# Patient Record
Sex: FEMALE | Race: OTHER | Employment: FULL TIME | URBAN - METROPOLITAN AREA
[De-identification: names, ages, dates, MRNs, and addresses within clinical notes are randomized per-mention and may not be internally consistent; named-entity substitution may affect disease eponyms.]

---

## 2017-02-10 ENCOUNTER — APPOINTMENT (OUTPATIENT)
Dept: GENERAL RADIOLOGY | Age: 57
End: 2017-02-10
Attending: NURSE PRACTITIONER
Payer: SELF-PAY

## 2017-02-10 ENCOUNTER — OFFICE VISIT (OUTPATIENT)
Dept: FAMILY MEDICINE CLINIC | Facility: CLINIC | Age: 57
End: 2017-02-10

## 2017-02-10 ENCOUNTER — HOSPITAL ENCOUNTER (EMERGENCY)
Age: 57
Discharge: HOME | End: 2017-02-10
Attending: EMERGENCY MEDICINE
Payer: SELF-PAY

## 2017-02-10 VITALS
HEART RATE: 66 BPM | HEIGHT: 60 IN | SYSTOLIC BLOOD PRESSURE: 220 MMHG | RESPIRATION RATE: 15 BRPM | DIASTOLIC BLOOD PRESSURE: 110 MMHG | TEMPERATURE: 95.5 F | BODY MASS INDEX: 22.42 KG/M2 | WEIGHT: 114.2 LBS | OXYGEN SATURATION: 99 %

## 2017-02-10 VITALS
RESPIRATION RATE: 15 BRPM | HEIGHT: 59 IN | TEMPERATURE: 97.4 F | BODY MASS INDEX: 22.98 KG/M2 | SYSTOLIC BLOOD PRESSURE: 155 MMHG | HEART RATE: 72 BPM | DIASTOLIC BLOOD PRESSURE: 70 MMHG | WEIGHT: 114 LBS | OXYGEN SATURATION: 100 %

## 2017-02-10 DIAGNOSIS — I16.0 HYPERTENSIVE URGENCY: Primary | ICD-10-CM

## 2017-02-10 DIAGNOSIS — Z86.73 HISTORY OF TIA (TRANSIENT ISCHEMIC ATTACK): ICD-10-CM

## 2017-02-10 DIAGNOSIS — Z12.11 SPECIAL SCREENING FOR MALIGNANT NEOPLASMS, COLON: ICD-10-CM

## 2017-02-10 DIAGNOSIS — I10 ESSENTIAL HYPERTENSION: ICD-10-CM

## 2017-02-10 DIAGNOSIS — I16.0 HYPERTENSIVE URGENCY: ICD-10-CM

## 2017-02-10 DIAGNOSIS — Z76.89 ENCOUNTER TO ESTABLISH CARE: Primary | ICD-10-CM

## 2017-02-10 DIAGNOSIS — I47.1 SVT (SUPRAVENTRICULAR TACHYCARDIA) (HCC): ICD-10-CM

## 2017-02-10 DIAGNOSIS — Z12.39 BREAST CANCER SCREENING: ICD-10-CM

## 2017-02-10 LAB
ALBUMIN SERPL BCP-MCNC: 4.6 G/DL (ref 3.4–5)
ALBUMIN/GLOB SERPL: 1.4 {RATIO} (ref 0.8–1.7)
ALP SERPL-CCNC: 111 U/L (ref 45–117)
ALT SERPL-CCNC: 20 U/L (ref 13–56)
ANION GAP BLD CALC-SCNC: 6 MMOL/L (ref 3–18)
APPEARANCE UR: CLEAR
AST SERPL W P-5'-P-CCNC: 22 U/L (ref 15–37)
BASOPHILS # BLD AUTO: 0 K/UL (ref 0–0.06)
BASOPHILS # BLD: 0 % (ref 0–2)
BILIRUB DIRECT SERPL-MCNC: 0.1 MG/DL (ref 0–0.2)
BILIRUB SERPL-MCNC: 0.4 MG/DL (ref 0.2–1)
BILIRUB UR QL: NEGATIVE
BUN SERPL-MCNC: 17 MG/DL (ref 7–18)
BUN/CREAT SERPL: 22 (ref 12–20)
CALCIUM SERPL-MCNC: 9.6 MG/DL (ref 8.5–10.1)
CHLORIDE SERPL-SCNC: 104 MMOL/L (ref 100–108)
CK MB CFR SERPL CALC: 0.6 % (ref 0–4)
CK MB SERPL-MCNC: 0.6 NG/ML (ref 0.5–3.6)
CK SERPL-CCNC: 100 U/L (ref 26–192)
CO2 SERPL-SCNC: 31 MMOL/L (ref 21–32)
COLOR UR: YELLOW
CREAT SERPL-MCNC: 0.76 MG/DL (ref 0.6–1.3)
DIFFERENTIAL METHOD BLD: NORMAL
EOSINOPHIL # BLD: 0 K/UL (ref 0–0.4)
EOSINOPHIL NFR BLD: 0 % (ref 0–5)
ERYTHROCYTE [DISTWIDTH] IN BLOOD BY AUTOMATED COUNT: 12.6 % (ref 11.6–14.5)
GLOBULIN SER CALC-MCNC: 3.3 G/DL (ref 2–4)
GLUCOSE SERPL-MCNC: 96 MG/DL (ref 74–99)
GLUCOSE UR STRIP.AUTO-MCNC: NEGATIVE MG/DL
HCT VFR BLD AUTO: 42.3 % (ref 35–45)
HGB BLD-MCNC: 14.2 G/DL (ref 12–16)
HGB UR QL STRIP: NEGATIVE
KETONES UR QL STRIP.AUTO: NEGATIVE MG/DL
LEUKOCYTE ESTERASE UR QL STRIP.AUTO: NEGATIVE
LYMPHOCYTES # BLD AUTO: 28 % (ref 21–52)
LYMPHOCYTES # BLD: 1.7 K/UL (ref 0.9–3.6)
MCH RBC QN AUTO: 30.7 PG (ref 24–34)
MCHC RBC AUTO-ENTMCNC: 33.6 G/DL (ref 31–37)
MCV RBC AUTO: 91.6 FL (ref 74–97)
MONOCYTES # BLD: 0.3 K/UL (ref 0.05–1.2)
MONOCYTES NFR BLD AUTO: 4 % (ref 3–10)
NEUTS SEG # BLD: 4.1 K/UL (ref 1.8–8)
NEUTS SEG NFR BLD AUTO: 68 % (ref 40–73)
NITRITE UR QL STRIP.AUTO: NEGATIVE
PH UR STRIP: 7.5 [PH] (ref 5–8)
PLATELET # BLD AUTO: 252 K/UL (ref 135–420)
PMV BLD AUTO: 10.2 FL (ref 9.2–11.8)
POTASSIUM SERPL-SCNC: 3.8 MMOL/L (ref 3.5–5.5)
PROT SERPL-MCNC: 7.9 G/DL (ref 6.4–8.2)
PROT UR STRIP-MCNC: NEGATIVE MG/DL
RBC # BLD AUTO: 4.62 M/UL (ref 4.2–5.3)
SODIUM SERPL-SCNC: 141 MMOL/L (ref 136–145)
SP GR UR REFRACTOMETRY: 1.01 (ref 1–1.03)
TROPONIN I SERPL-MCNC: <0.02 NG/ML (ref 0–0.04)
UROBILINOGEN UR QL STRIP.AUTO: 0.2 EU/DL (ref 0.2–1)
WBC # BLD AUTO: 6.1 K/UL (ref 4.6–13.2)

## 2017-02-10 PROCEDURE — 82550 ASSAY OF CK (CPK): CPT | Performed by: NURSE PRACTITIONER

## 2017-02-10 PROCEDURE — 81003 URINALYSIS AUTO W/O SCOPE: CPT | Performed by: EMERGENCY MEDICINE

## 2017-02-10 PROCEDURE — 93005 ELECTROCARDIOGRAM TRACING: CPT

## 2017-02-10 PROCEDURE — 96374 THER/PROPH/DIAG INJ IV PUSH: CPT

## 2017-02-10 PROCEDURE — 74011250636 HC RX REV CODE- 250/636: Performed by: NURSE PRACTITIONER

## 2017-02-10 PROCEDURE — 71010 XR CHEST PORT: CPT

## 2017-02-10 PROCEDURE — 80048 BASIC METABOLIC PNL TOTAL CA: CPT | Performed by: EMERGENCY MEDICINE

## 2017-02-10 PROCEDURE — 80076 HEPATIC FUNCTION PANEL: CPT | Performed by: EMERGENCY MEDICINE

## 2017-02-10 PROCEDURE — 99285 EMERGENCY DEPT VISIT HI MDM: CPT

## 2017-02-10 PROCEDURE — 85025 COMPLETE CBC W/AUTO DIFF WBC: CPT | Performed by: EMERGENCY MEDICINE

## 2017-02-10 RX ORDER — GUAIFENESIN 100 MG/5ML
81 LIQUID (ML) ORAL DAILY
COMMUNITY

## 2017-02-10 RX ORDER — MULTIVIT WITH MINERALS/HERBS
1 TABLET ORAL DAILY
COMMUNITY

## 2017-02-10 RX ORDER — METOPROLOL SUCCINATE 25 MG/1
12.5 TABLET, EXTENDED RELEASE ORAL 2 TIMES DAILY
Qty: 30 TAB | Refills: 2 | Status: SHIPPED | OUTPATIENT
Start: 2017-02-10 | End: 2017-04-25 | Stop reason: SDUPTHER

## 2017-02-10 RX ORDER — HYDRALAZINE HYDROCHLORIDE 20 MG/ML
10 INJECTION INTRAMUSCULAR; INTRAVENOUS ONCE
Status: COMPLETED | OUTPATIENT
Start: 2017-02-10 | End: 2017-02-10

## 2017-02-10 RX ORDER — MENTHOL
1000 GEL (GRAM) TOPICAL DAILY
COMMUNITY

## 2017-02-10 RX ORDER — METOPROLOL SUCCINATE 25 MG/1
TABLET, EXTENDED RELEASE ORAL DAILY
COMMUNITY
End: 2017-02-10 | Stop reason: SDUPTHER

## 2017-02-10 RX ADMIN — HYDRALAZINE HYDROCHLORIDE 10 MG: 20 INJECTION INTRAMUSCULAR; INTRAVENOUS at 13:07

## 2017-02-10 NOTE — ED NOTES
Pt being discharged home. Discharge instructions given, and pt expresses understanding. Discontinued IV and helped patient to gather belongings. Pt discharged with family member.

## 2017-02-10 NOTE — PROGRESS NOTES
Jerzy Ventura is a 60 y.o.  female presents today for office visit for establish care. Pt is in Room # 8.      1. Have you been to the ER, urgent care clinic since your last visit? Hospitalized since your last visit? Yes [facility] 7/24/14 for TIA, [facility] for [facility]. 2. Have you seen or consulted any other health care providers outside of the [address] [place] [address] since your last visit? Include any pap smears or colon screening.  Yes Dr. Nick Chiu, PCP , [place], [place] [address] and Cardiology( [facility] 8/2016      Upcoming Appts  N/A

## 2017-02-10 NOTE — ED PROVIDER NOTES
HPI Comments: Brookie Baumgarten is a 60year old dfemale who presetns to the ED after being sent here by her family physician. Pt blood pressure was up in the office, and she was snet here for further evaluation of this phenomenon. Pt denies all pain. States she has no Hx of MI. Patient is a 60 y.o. female presenting with hypertension. The history is provided by the patient. History limited by: no communication barrier. Hypertension    This is a new problem. The current episode started 3 to 5 hours ago. The problem has not changed since onset. Past Medical History:   Diagnosis Date    FHx: SVT (supraventricular tachycardia) 05/2016    Fibroid uterus 2011    H/O cyst of breast 1987    Hypertension     TIA (transient ischemic attack) 2014       Past Surgical History:   Procedure Laterality Date    Hx breast biopsy Right 1987         Family History:   Problem Relation Age of Onset    Hypertension Other     Cancer Mother 36     Cervical    Stroke Father     No Known Problems Sister     Heart Attack Paternal Aunt     No Known Problems Sister        Social History     Social History    Marital status:      Spouse name: N/A    Number of children: 0    Years of education: 12     Occupational History    Not on file. Social History Main Topics    Smoking status: Never Smoker    Smokeless tobacco: Never Used    Alcohol use No    Drug use: No    Sexual activity: No     Other Topics Concern     Service No    Caffeine Concern No    Occupational Exposure No    Hobby Hazards No    Stress Concern Yes    Special Diet No    Seat Belt Yes     Social History Narrative         ALLERGIES: Aldomet [methyldopa]; Benadryl [diphenhydramine hcl]; Cozaar [losartan]; Dyazide [triamterene-hydrochlorothiazid]; Levaquin [levofloxacin]; Norvasc [amlodipine]; Penicillins; Sulfa (sulfonamide antibiotics); and Verapamil    Review of Systems   Constitutional: Negative. HENT: Negative. Eyes: Negative. Respiratory: Negative. Cardiovascular:        Elevated blood pressure     Gastrointestinal: Negative. Endocrine: Negative. Genitourinary: Negative. Musculoskeletal: Negative. Skin: Negative. Allergic/Immunologic: Negative. Neurological: Negative. Hematological: Negative. Psychiatric/Behavioral: Negative. Vitals:    02/10/17 1153   BP: (!) 236/117   Pulse: 63   Resp: 17   Temp: 97.4 °F (36.3 °C)   SpO2: 100%   Weight: 51.7 kg (114 lb)   Height: 4' 11\" (1.499 m)            Physical Exam   Constitutional: She is oriented to person, place, and time. She appears well-developed and well-nourished. No distress. HENT:   Head: Normocephalic and atraumatic. Eyes: EOM are normal. Pupils are equal, round, and reactive to light. Neck: Normal range of motion. Neck supple. Cardiovascular: Normal rate, regular rhythm, normal heart sounds and intact distal pulses. Pulmonary/Chest: Effort normal. No respiratory distress. She has no wheezes. She has no rales. Abdominal: Soft. Bowel sounds are normal. There is no tenderness. There is no rebound. Genitourinary:   Genitourinary Comments: NE   Musculoskeletal: Normal range of motion. Neurological: She is alert and oriented to person, place, and time. No cranial nerve deficit. Coordination normal.   Skin: Skin is warm and dry. Psychiatric: She has a normal mood and affect. Nursing note and vitals reviewed. MDM  Number of Diagnoses or Management Options  Hypertensive urgency:      Amount and/or Complexity of Data Reviewed  Tests in the radiology section of CPT®: ordered and reviewed    Risk of Complications, Morbidity, and/or Mortality  Presenting problems: moderate  Diagnostic procedures: moderate  Management options: moderate    Patient Progress  Patient progress: stable    ED Course       Procedures    Diagnosis:   1. Hypertensive urgency          Disposition:   Discharged to Home.       Follow-up Information     Follow up With Details Comments [address] [place], [place] Call today for a follow up appointment. [address] [place] [address] [phone]            Patient's Medications   Start Taking    No medications on file   Continue Taking    ASPIRIN 81 MG CHEWABLE TABLET    Take 81 mg by mouth daily. B COMPLEX VITAMINS TABLET    Take 1 Tab by mouth daily. METOPROLOL SUCCINATE (TOPROL-XL) 25 MG XL TABLET    Take 0.5 Tabs by mouth two (2) times a day. MULTIVIT WITH CALCIUM,IRON,MIN [facility] MULTIPLE VITAMINS PO)    Take 1 Tab by mouth daily. VITAMIN E (E GEMS) 1,000 UNIT CAPSULE    Take 1,000 Units by mouth daily.    These Medications have changed    No medications on file   Stop Taking    No medications on file

## 2017-02-10 NOTE — PATIENT INSTRUCTIONS
Learning About Diuretics for High Blood Pressure  Introduction  Diuretics help to lower blood pressure. This reduces your risk of a heart attack and stroke. It also reduces your risk of kidney disease. Diuretics cause your kidneys to remove sodium and water. They also relax the blood vessel walls. These help lower your blood pressure. Examples  · Chlorthalidone  · Hydrochlorothiazide  Possible side effects  There are some common side effects. They are:  · Too little potassium. · Feeling dizzy. · Rash. · Urinating a lot. · High blood sugar. (But this is not common.)  You may have other side effects. Check the information that comes with your medicine. What to know about taking this medicine  · You may take other medicines for blood pressure. Diuretics can help those work better. They can also prevent extra fluid in your body. · You may need to take potassium pills. Or you may have to watch how much potassium is in your food. Ask your doctor about this. · You may need blood tests to check your kidneys and your potassium level. · Take your medicines exactly as prescribed. Call your doctor if you think you are having a problem with your medicine. · Check with your doctor or pharmacist before you use any other medicines. This includes over-the-counter medicines. Make sure your doctor knows all of the medicines, vitamins, herbal products, and supplements you take. Taking some medicines together can cause problems. Where can you learn more? Go to http://jono-osito.info/. Enter E175 in the search box to learn more about \"Learning About Diuretics for High Blood Pressure. \"  Current as of: January 27, 2016  Content Version: 11.1  © 8621-3146 [employer]. Care instructions adapted under license by [employer] (which disclaims liability or warranty for this information).  If you have questions about a medical condition or this instruction, always ask your healthcare professional. [address] any warranty or liability for your use of this information.

## 2017-02-10 NOTE — DISCHARGE INSTRUCTIONS
Take your BP medication as prescribed. Follow up with your primary care provider for re-evaluation of your hypertensive plan. MyChart Activation    Thank you for requesting access to [facility]. Please follow the instructions below to securely access and download your online medical record. [facility] allows you to send messages to your doctor, view your test results, renew your prescriptions, schedule appointments, and more. How Do I Sign Up? 1. In your internet browser, go to [email or web address]  2. Click on the First Time User? Click Here link in the Sign In box. You will be redirect to the New Member Sign Up page. 3. Enter your [facility] Access Code exactly as it appears below. You will not need to use this code after youve completed the sign-up process. If you do not sign up before the expiration date, you must request a new code. [facility] Access Code: [email or web address]  Expires: 2017  9:42 AM (This is the date your [facility] access code will )    4. Enter the last four digits of your Social Security Number (xxxx) and Date of Birth (mm/dd/yyyy) as indicated and click Submit. You will be taken to the next sign-up page. 5. Create a [facility] ID. This will be your [facility] login ID and cannot be changed, so think of one that is secure and easy to remember. 6. Create a [facility] password. You can change your password at any time. 7. Enter your Password Reset Question and Answer. This can be used at a later time if you forget your password. 8. Enter your e-mail address. You will receive e-mail notification when new information is available in [address]. 9. Click Sign Up. You can now view and download portions of your medical record. 10. Click the Download Summary menu link to download a portable copy of your medical information.     Additional Information    If you have questions, please visit the Frequently Asked Questions section of the [facility] website at [email or web address] Remember, [employer] is NOT to be used for urgent needs. For medical emergencies, dial 911.

## 2017-02-10 NOTE — PROGRESS NOTES
History and Physical    Patient: Tone Craig MRN: [id number]  SSN: [id number]    YOB: 1960  Age: 60 y.o. Sex: female      Subjective:      Tone Craig is a 60 y.o. female who presents today establish care. Patient currently does not have health insurance. Patient has a h/o SVT, patient was seeing a cardiologist in [place], Dr. Ame Soto, she states that because she only had one run of SVT within the past year that she did not need to be on anything else except for metoprolol. Prior to moving to [place], she was seeing a cardiologist at the [facility] clinic, which is a free cardiac clinic here. Patient has a h/o HTN, she monitors her BP at home, she states that before she came over here her BP was 130/70, she admits to having a h/o white coat HTN, BP extremely high today. Patient states that she can no longer take calcium channel blockers as after a while her body started to reject it, she started to get a rash and tingling sensation. Patient is currently on metoprolol 25 mg but cuts it in half, she states that she has an intolerance to higher doses, makes her really tired. Patient has an allergy to ACE/ARBS, causes rash and \"messes with her nervous system. \"   Patient states that she was on Diazide but it made her feel like she had electricity down her spinal column. She states that these reactions are delayed, so she may be on these medications for about a month and then will notice symptoms. She took half of her metoprolol today, currently c/o dull headache, but states she wakes up with them. Patient has a h/o TIA 3 years ago, she states that her blood pressure was extremely high at the time. She denies recurrent episodes such as confusion, slurred speech, unilateral weakness etc.  Patient has a family h/o stroke and heart attack, she is currently on 80 ASA.         Last Colonoscopy:  has not had done yet-fit ordered  Last DEXA: never done; will await patient to have health insurance first  Last Mammogram: 2011- was normal, no h/o abnormals-ordered today  Last PAP: 3/2016-normal;  no h/o abnormals      PMH:  Past Medical History   Diagnosis Date    FHx: SVT (supraventricular tachycardia) 05/2016    Fibroid uterus 2011    H/O cyst of breast 1987    Hypertension     TIA (transient ischemic attack) 2014     Past Surgical History   Procedure Laterality Date    Hx breast biopsy Right 1987        FamHx:  Family History   Problem Relation Age of Onset    Hypertension Other     Cancer Mother 36     Cervical    Stroke Father     No Known Problems Sister     Heart Attack Paternal Aunt     No Known Problems Sister        SocialHx:  Social History   Substance Use Topics    Smoking status: Never Smoker    Smokeless tobacco: Never Used    Alcohol use No        Meds:  Prior to Admission medications    Medication Sig Start Date End Date Taking? Authorizing Provider   vitamin e (E GEMS) 1,000 unit capsule Take 1,000 Units by mouth daily. Yes Historical Provider   b complex vitamins tablet Take 1 Tab by mouth daily. Yes Historical Provider   MULTIVIT WITH CALCIUM,IRON,MIN [facility] MULTIPLE VITAMINS PO) Take 1 Tab by mouth daily. Yes Historical Provider   metoprolol succinate (TOPROL-XL) 25 mg XL tablet Take 0.5 Tabs by mouth two (2) times a day. 2/10/17  Yes Corunna Foot V, PA   aspirin 81 mg chewable tablet Take 81 mg by mouth daily. Yes Historical Provider        Allergies: Allergies   Allergen Reactions    Aldomet [Methyldopa] Rash    Benadryl [Diphenhydramine Hcl] Other (comments)     incontinence    Cozaar [Losartan] Other (comments)     Neurological issues    Dyazide [Triamterene-Hydrochlorothiazid] Other (comments)     Neurological issues    Levaquin [Levofloxacin] Hives    Norvasc [Amlodipine] Rash and Other (comments)     Neurological issues    Penicillins Rash     Ok to take 2nd generation.     Sulfa (Sulfonamide Antibiotics) Rash    Verapamil Other (comments)     Neurological issues       Review of Systems:  Items in Bold are positive  Constitutional: negative for fevers, chills and malaise  Eyes: negative for visual disturbance  Ears, Nose, Mouth, Throat, and Face: negative for nasal congestion  Respiratory: negative for cough or SOB  Cardiovascular: negative for chest pain, chest pressure/discomfort  Gastrointestinal: negative for nausea, vomiting, melena, diarrhea, constipation and abdominal pain  Genitourinary:negative for frequency, dysuria or hematuria  Musculoskeletal:negative for myalgias and arthralgias  Neurological: dull headache, negative for dizziness and paresthesia  Psych: anxiety-stable, denies depression, si/hi    Objective:     Visit Vitals    BP (!) 220/110 (BP 1 Location: Left arm, BP Patient Position: Sitting)    Pulse 66    Temp 95.5 °F (35.3 °C) (Oral)    Resp 15    Ht 4' 11.5\" (1.511 m)    Wt 114 lb 3.2 oz (51.8 kg)    SpO2 99%    BMI 22.68 kg/m2       Physical Exam:  GENERAL: alert, cooperative, no distress, appears stated age  HEENT: EYE: conjunctivae/corneas clear. PERRL, EOM's intact. EAR: TM's pearly gray bilaterally NOSE: Nasal mucosa pink and moist bilaterally, THROAT: no erythema or edema  THYROID: no thyromegaly  NECK: no adenopathy  LUNG: clear to auscultation bilaterally  HEART: regular rate and rhythm, S1, S2 normal, no murmur, click, rub or gallop  ABDOMEN: soft, non-tender. Bowel sounds normal. No masses  EXTREMITIES:  extremities normal, atraumatic, no cyanosis or edema  NEUROLOGIC: AOx3. Gait normal.  PSYCH: mood: happy; affect: slightly anxious      Assessment and Plan:       ICD-10-CM ICD-9-CM    1. Encounter to establish care Z76.89 V65.8 CBC WITH AUTOMATED DIFF      METABOLIC PANEL, COMPREHENSIVE      HEMOGLOBIN A1C WITH EAG      LIPID PANEL      VITAMIN D, 25 HYDROXY      TSH 3RD GENERATION      T4, FREE   2. Essential hypertension I10 401.9 metoprolol succinate (TOPROL-XL) 25 mg XL tablet   3. Hypertensive urgency I16.0 401.9    4. SVT (supraventricular tachycardia) I47.1 427.89 metoprolol succinate (TOPROL-XL) 25 mg XL tablet      REFERRAL TO CARDIOLOGY   5. History of TIA (transient ischemic attack) Z86.73 V12.54    6. Special screening for malignant neoplasms, colon Z12.11 V76.51 OCCULT BLOOD, IMMUNOASSAY (FIT)   7. Breast cancer screening Z12.39 V76.10 VANDANA MAMMO BI SCREENING INCL CAD         Medical Decision Making:  Establish Care- will await medical records with labs    HTN/Hypertensive Urgency- EMS called, patient transported    SVT- referral to cardiology    History of TIA- continue asa- need medical records          Follow-up Disposition:  Return in about 1 week (around 2/17/2017) for routine care with me, for bp check. Patient acknowledges understanding of instructions and acknowledges understanding to call back if current symptoms worsen or new symptoms arise. Patient acknowledges and agrees with plan.         Signed By: ARUNA Osborne     February 10, 2017

## 2017-02-12 LAB
ATRIAL RATE: 66 BPM
CALCULATED P AXIS, ECG09: 29 DEGREES
CALCULATED R AXIS, ECG10: -16 DEGREES
CALCULATED T AXIS, ECG11: 29 DEGREES
DIAGNOSIS, 93000: NORMAL
P-R INTERVAL, ECG05: 114 MS
Q-T INTERVAL, ECG07: 444 MS
QRS DURATION, ECG06: 82 MS
QTC CALCULATION (BEZET), ECG08: 465 MS
VENTRICULAR RATE, ECG03: 66 BPM

## 2017-02-17 ENCOUNTER — OFFICE VISIT (OUTPATIENT)
Dept: FAMILY MEDICINE CLINIC | Facility: CLINIC | Age: 57
End: 2017-02-17

## 2017-02-17 VITALS
TEMPERATURE: 95.6 F | OXYGEN SATURATION: 99 % | WEIGHT: 114 LBS | DIASTOLIC BLOOD PRESSURE: 94 MMHG | HEIGHT: 59 IN | BODY MASS INDEX: 22.98 KG/M2 | SYSTOLIC BLOOD PRESSURE: 180 MMHG | RESPIRATION RATE: 16 BRPM | HEART RATE: 96 BPM

## 2017-02-17 DIAGNOSIS — Z86.73 HISTORY OF TIA (TRANSIENT ISCHEMIC ATTACK): ICD-10-CM

## 2017-02-17 DIAGNOSIS — I10 ESSENTIAL HYPERTENSION: Primary | ICD-10-CM

## 2017-02-17 DIAGNOSIS — I47.1 SVT (SUPRAVENTRICULAR TACHYCARDIA) (HCC): ICD-10-CM

## 2017-02-17 DIAGNOSIS — F41.9 ANXIETY: ICD-10-CM

## 2017-02-17 RX ORDER — HYDROCHLOROTHIAZIDE 12.5 MG/1
12.5 TABLET ORAL DAILY
Qty: 30 TAB | Refills: 0 | Status: SHIPPED | OUTPATIENT
Start: 2017-02-17 | End: 2017-03-28 | Stop reason: SDUPTHER

## 2017-02-17 RX ORDER — ALPRAZOLAM 0.25 MG/1
0.25 TABLET ORAL
Qty: 30 TAB | Refills: 0 | Status: SHIPPED | OUTPATIENT
Start: 2017-02-17 | End: 2017-07-05

## 2017-02-17 RX ORDER — FLUOXETINE 10 MG/1
10 CAPSULE ORAL DAILY
Qty: 30 CAP | Refills: 1 | Status: SHIPPED | OUTPATIENT
Start: 2017-02-17 | End: 2017-07-05

## 2017-02-17 NOTE — PROGRESS NOTES
History and Physical    Patient: Екатерина Louis MRN: [id number]  SSN: [id number]    YOB: 1960  Age: 60 y.o. Sex: female      Subjective:      Екатерина Louis is a 60 y.o. female who presents today for an ED follow up from 2/10/17 for hypertensive urgency. Patients BP remains uncontrolled, only taking half tablet of Metorpolol 25 mg BID, has many drug allergies/insensitivities. Still having headaches with new onset sinus pressure for about 1 week, has been taking claritin, headache not better. Her BP drug intolerance is as follows:    Lopressor, extreme tinging, numb sensation and heaviness in arms. Verapamil, severe tachycardia, felt like she could not move. Calcium channel blockers, irregular heart beat: both decreased heart rate and tachycardia    Patient also admits to feeling very stressed lately, which she believes is contributing to her anxiety. She has not been treated for anxiety in the past, she denies depression, si/hi.     Last Colonoscopy:  has not had done yet-fit ordered-not done  Last DEXA: never done; will await patient to have health insurance first  Last Mammogram: 2011- was normal, no h/o abnormals-scheduled for 3/28/17  Last PAP: 3/2016-normal; no h/o abnormals      PMH:  Past Medical History   Diagnosis Date    FHx: SVT (supraventricular tachycardia) 05/2016    Fibroid uterus 2011    H/O cyst of breast 1987    Hypertension     TIA (transient ischemic attack) 2014     Past Surgical History   Procedure Laterality Date    Hx breast biopsy Right 1987        FamHx:  Family History   Problem Relation Age of Onset    Hypertension Other     Cancer Mother 36     Cervical    Stroke Father     No Known Problems Sister     Heart Attack Paternal Aunt     No Known Problems Sister        SocialHx:  Social History   Substance Use Topics    Smoking status: Never Smoker    Smokeless tobacco: Never Used    Alcohol use No        Meds:  Prior to Admission medications    Medication Sig Start Date End Date Taking? Authorizing Provider   ALPRAZolam Geoffry Chill) 0.25 mg tablet Take 1 Tab by mouth daily as needed for Anxiety. 2/17/17  Yes Jessica Pumphrey V, PA   hydroCHLOROthiazide (HYDRODIURIL) 12.5 mg tablet Take 1 Tab by mouth daily. 2/17/17  Yes Jessica Pumphrey V, PA   FLUoxetine (PROZAC) 10 mg capsule Take 1 Cap by mouth daily. 2/17/17  Yes Jessica Pumphrey V, PA   vitamin e (E GEMS) 1,000 unit capsule Take 1,000 Units by mouth daily. Yes Historical Provider   b complex vitamins tablet Take 1 Tab by mouth daily. Yes Historical Provider   MULTIVIT WITH CALCIUM,IRON,MIN [facility] MULTIPLE VITAMINS PO) Take 1 Tab by mouth daily. Yes Historical Provider   metoprolol succinate (TOPROL-XL) 25 mg XL tablet Take 0.5 Tabs by mouth two (2) times a day. 2/10/17  Yes ARUNA Henriquez   aspirin 81 mg chewable tablet Take 81 mg by mouth daily. Yes Historical Provider        Allergies: Allergies   Allergen Reactions    Aldomet [Methyldopa] Rash    Benadryl [Diphenhydramine Hcl] Other (comments)     incontinence    Cozaar [Losartan] Other (comments)     Neurological issues    Dyazide [Triamterene-Hydrochlorothiazid] Other (comments)     Neurological issues    Levaquin [Levofloxacin] Hives    Norvasc [Amlodipine] Rash and Other (comments)     Neurological issues    Penicillins Rash     Ok to take 2nd generation.     Sulfa (Sulfonamide Antibiotics) Rash    Verapamil Other (comments)     Neurological issues       Review of Systems:  Items in Bold are positive  Constitutional: negative for fevers, chills and malaise  Eyes: negative for visual disturbance  Ears, Nose, Mouth, Throat, and Face: sinus pressure, negative for nasal congestion  Respiratory: negative for cough or SOB  Cardiovascular: negative for chest pain, chest pressure/discomfort  Gastrointestinal: negative for nausea, vomiting, melena, diarrhea, constipation and abdominal pain  Genitourinary:negative for frequency, dysuria or hematuria  Musculoskeletal:negative for myalgias and arthralgias  Neurological: headache, negative for dizziness and paresthesia  Psych: anxiety- uncontrolled, denies depression, si/hi    Objective:     Visit Vitals    BP (!) 180/94 (BP 1 Location: Left arm, BP Patient Position: Sitting)    Pulse 96    Temp 95.6 °F (35.3 °C) (Oral)    Resp 16    Ht 4' 11\" (1.499 m)    Wt 114 lb (51.7 kg)    SpO2 99%    BMI 23.03 kg/m2       Physical Exam:  GENERAL: alert, cooperative, no distress, appears stated age  HEENT: EYE: conjunctivae/corneas clear. PERRL, EOM's intact. LUNG: clear to auscultation bilaterally  HEART: regular rate and rhythm, S1, S2 normal, no murmur, click, rub or gallop  ABDOMEN: soft, non-tender. Bowel sounds normal. No masses  EXTREMITIES:  extremities normal, atraumatic, no cyanosis or edema  NEUROLOGIC: AOx3. Gait normal.  PSYCH: mood: anxious; affect: neutral/anxious    Labs  Lab Results   Component Value Date/Time    Sodium 141 02/10/2017 12:39 PM    Potassium 3.8 02/10/2017 12:39 PM    Chloride 104 02/10/2017 12:39 PM    CO2 31 02/10/2017 12:39 PM    Anion gap 6 02/10/2017 12:39 PM    Glucose 96 02/10/2017 12:39 PM    BUN 17 02/10/2017 12:39 PM    Creatinine 0.76 02/10/2017 12:39 PM    BUN/Creatinine ratio 22 02/10/2017 12:39 PM    GFR est AA >60 02/10/2017 12:39 PM    GFR est non-AA >60 02/10/2017 12:39 PM    Calcium 9.6 02/10/2017 12:39 PM    Bilirubin, total 0.4 02/10/2017 12:39 PM    AST (SGOT) 22 02/10/2017 12:39 PM    Alk.  phosphatase 111 02/10/2017 12:39 PM    Protein, total 7.9 02/10/2017 12:39 PM    Albumin 4.6 02/10/2017 12:39 PM    Globulin 3.3 02/10/2017 12:39 PM    A-G Ratio 1.4 02/10/2017 12:39 PM    ALT (SGPT) 20 02/10/2017 12:39 PM     Lab Results   Component Value Date/Time    WBC 6.1 02/10/2017 12:39 PM    Hemoglobin (POC) 12.6 09/22/2012 10:52 AM    HGB 14.2 02/10/2017 12:39 PM    Hematocrit (POC) 37 09/22/2012 10:52 AM    HCT 42.3 02/10/2017 12:39 PM PLATELET 820 71/57/5660 12:39 PM    MCV 91.6 02/10/2017 12:39 PM         Assessment and Plan:       ICD-10-CM ICD-9-CM    1. Essential hypertension I10 401.9 hydroCHLOROthiazide (HYDRODIURIL) 12.5 mg tablet   2. Anxiety F41.9 300.00 ALPRAZolam (XANAX) 0.25 mg tablet      FLUoxetine (PROZAC) 10 mg capsule   3. SVT (supraventricular tachycardia) I47.1 427.89    4. History of TIA (transient ischemic attack) Z86.73 V12.54          Medical Decision Making:  HTN- start HCTZ 12.5 mg + continue Metoprolol    Anxiety- start Prozac + Xanax PRN    SVT- referral to cardiology pending + continue BB    H/O TIA- continue asa      Follow-up Disposition:  Return in about 2 weeks (around 3/3/2017) for routine care with me. Patient acknowledges understanding of instructions and acknowledges understanding to call back if current symptoms worsen or new symptoms arise. Patient acknowledges and agrees with plan.         Signed By: ARUNA Osborne     February 19, 2017

## 2017-02-17 NOTE — PATIENT INSTRUCTIONS

## 2017-02-17 NOTE — MR AVS SNAPSHOT
Visit Information Date & Time Provider Department Dept. Phone Encounter #  
 2/17/2017 11:00 AM Noe Beauchamp [phone] [id number] Follow-up Instructions Return in about 2 weeks (around 3/3/2017) for routine care with me. Upcoming Health Maintenance Date Due  
 BREAST CANCER SCRN MAMMOGRAM 3/8/2008 FOBT Q 1 YEAR AGE 50-75 3/8/2008 PAP AKA CERVICAL CYTOLOGY 3/1/2019 DTaP/Tdap/Td series (2 - Td) 1/1/2026 Allergies as of 2/17/2017  Review Complete On: 2/17/2017 By: Jorge Bone LPN Severity Noted Reaction Type Reactions Aldomet [Methyldopa]  02/10/2017    Rash Benadryl [Diphenhydramine Hcl]  02/10/2017    Other (comments)  
 incontinence Cozaar [Losartan]  02/10/2017   Intolerance Other (comments) Neurological issues Dyazide [Triamterene-hydrochlorothiazid]  02/10/2017   Intolerance Other (comments) Neurological issues Levaquin [Levofloxacin]  02/10/2017   Intolerance Hives Norvasc [Amlodipine]  02/10/2017   Intolerance Rash, Other (comments) Neurological issues Penicillins  02/10/2017    Rash Ok to take 2nd generation. Sulfa (Sulfonamide Antibiotics)  02/10/2017   Side Effect Rash Verapamil  02/10/2017   Intolerance Other (comments) Neurological issues Current Immunizations  Never Reviewed No immunizations on file. Not reviewed this visit You Were Diagnosed With   
  
 Codes Comments Essential hypertension    -  Primary ICD-10-CM: I10 
ICD-9-CM: 401.9 Anxiety     ICD-10-CM: F41.9 ICD-9-CM: 300.00 Vitals BP Pulse Temp Resp Height(growth percentile) Weight(growth percentile) (!) 180/94 (BP 1 Location: Left arm, BP Patient Position: Sitting) 96 95.6 °F (35.3 °C) (Oral) 16 4' 11\" (1.499 m) 114 lb (51.7 kg) SpO2 BMI OB Status Smoking Status 99% 23.03 kg/m2 Menopause Never Smoker Vitals History BMI and BSA Data Body Mass Index Body Surface Area 23.03 kg/m 2 1.47 m 2 Preferred Pharmacy Pharmacy Name Phone [facility][id number]- [phone], [address] [facility] [phone] Your Updated Medication List  
  
   
This list is accurate as of: 2/17/17 11:30 AM.  Always use your most recent med list.  
  
  
  
  
 ALPRAZolam 0.25 mg tablet Commonly known as:  Stephanie Shahram Take 1 Tab by mouth daily as needed for Anxiety. aspirin 81 mg chewable tablet Take 81 mg by mouth daily. b complex vitamins tablet Take 1 Tab by mouth daily. FLUoxetine 10 mg capsule Commonly known as:  PROzac Take 1 Cap by mouth daily. hydroCHLOROthiazide 12.5 mg tablet Commonly known as:  HYDRODIURIL Take 1 Tab by mouth daily. metoprolol succinate 25 mg XL tablet Commonly known as:  TOPROL-XL Take 0.5 Tabs by mouth two (2) times a day. vitamin e 1,000 unit capsule Commonly known as:  E GEMS Take 1,000 Units by mouth daily. WOMEN'S MULTIPLE VITAMINS PO Take 1 Tab by mouth daily. Prescriptions Printed Refills ALPRAZolam (XANAX) 0.25 mg tablet 0 Sig: Take 1 Tab by mouth daily as needed for Anxiety. Class: Print Route: Oral  
  
Prescriptions Sent to Pharmacy Refills  
 hydroCHLOROthiazide (HYDRODIURIL) 12.5 mg tablet 0 Sig: Take 1 Tab by mouth daily. Class: Normal  
 Pharmacy:  [facility], [address] [facility] Ph #: [phone] Route: Oral  
 FLUoxetine (PROZAC) 10 mg capsule 1 Sig: Take 1 Cap by mouth daily. Class: Normal  
 [facility] [address] [place] [address] Ph #: [phone] Route: Oral  
  
Follow-up Instructions Return in about 2 weeks (around 3/3/2017) for routine care with me. To-Do List   
 03/28/2017 11:00 AM  
  Appointment with [facility] at [facility] ([phone]) OUTSIDE FILMS  - Any outside films related to the study being scheduled should be brought with you on the day of the exam.  If this cannot be done there may be a delay in the reading of the study. MEDICATIONS  - Patient must bring a complete list of all medications currently taking to include prescriptions, over-the-counter meds, herbals, vitamins & any dietary supplements  GENERAL INSTRUCTIONS  - On the day of your exam do not use any bath powder, deodorant or lotions on the armpit area. -Tenderness of breasts may cause an increase of discomfort during procedure. If you are experiencing breast tenderness on the day of your appointment and would like to reschedule, please call [phone]. Patient Instructions DASH Diet: Care Instructions Your Care Instructions The DASH diet is an eating plan that can help lower your blood pressure. DASH stands for Dietary Approaches to Stop Hypertension. Hypertension is high blood pressure. The DASH diet focuses on eating foods that are high in calcium, potassium, and magnesium. These nutrients can lower blood pressure. The foods that are highest in these nutrients are fruits, vegetables, low-fat dairy products, nuts, seeds, and legumes. But taking calcium, potassium, and magnesium supplements instead of eating foods that are high in those nutrients does not have the same effect. The DASH diet also includes whole grains, fish, and poultry. The DASH diet is one of several lifestyle changes your doctor may recommend to lower your high blood pressure. Your doctor may also want you to decrease the amount of sodium in your diet. Lowering sodium while following the DASH diet can lower blood pressure even further than just the DASH diet alone. Follow-up care is a key part of your treatment and safety. Be sure to make and go to all appointments, and call your doctor if you are having problems.  It's also a good idea to know your test results and keep a list of the medicines you take. How can you care for yourself at home? Following the DASH diet · Eat 4 to 5 servings of fruit each day. A serving is 1 medium-sized piece of fruit, ½ cup chopped or canned fruit, 1/4 cup dried fruit, or 4 ounces (½ cup) of fruit juice. Choose fruit more often than fruit juice. · Eat 4 to 5 servings of vegetables each day. A serving is 1 cup of lettuce or raw leafy vegetables, ½ cup of chopped or cooked vegetables, or 4 ounces (½ cup) of vegetable juice. Choose vegetables more often than vegetable juice. · Get 2 to 3 servings of low-fat and fat-free dairy each day. A serving is 8 ounces of milk, 1 cup of yogurt, or 1 ½ ounces of cheese. · Eat 6 to 8 servings of grains each day. A serving is 1 slice of bread, 1 ounce of dry cereal, or ½ cup of cooked rice, pasta, or cooked cereal. Try to choose whole-grain products as much as possible. · Limit lean meat, poultry, and fish to 2 servings each day. A serving is 3 ounces, about the size of a deck of cards. · Eat 4 to 5 servings of nuts, seeds, and legumes (cooked dried beans, lentils, and split peas) each week. A serving is 1/3 cup of nuts, 2 tablespoons of seeds, or ½ cup of cooked beans or peas. · Limit fats and oils to 2 to 3 servings each day. A serving is 1 teaspoon of vegetable oil or 2 tablespoons of salad dressing. · Limit sweets and added sugars to 5 servings or less a week. A serving is 1 tablespoon jelly or jam, ½ cup sorbet, or 1 cup of lemonade. · Eat less than 2,300 milligrams (mg) of sodium a day. If you limit your sodium to 1,500 mg a day, you can lower your blood pressure even more. Tips for success · Start small. Do not try to make dramatic changes to your diet all at once. You might feel that you are missing out on your favorite foods and then be more likely to not follow the plan. Make small changes, and stick with them. Once those changes become habit, add a few more changes. · Try some of the following: ¨ Make it a goal to eat a fruit or vegetable at every meal and at snacks. This will make it easy to get the recommended amount of fruits and vegetables each day. ¨ Try yogurt topped with fruit and nuts for a snack or healthy dessert. ¨ Add lettuce, tomato, cucumber, and onion to sandwiches. ¨ Combine a ready-made pizza crust with low-fat mozzarella cheese and lots of vegetable toppings. Try using tomatoes, squash, spinach, broccoli, carrots, cauliflower, and onions. ¨ Have a variety of cut-up vegetables with a low-fat dip as an appetizer instead of chips and dip. ¨ Sprinkle sunflower seeds or chopped almonds over salads. Or try adding chopped walnuts or almonds to cooked vegetables. ¨ Try some vegetarian meals using beans and peas. Add garbanzo or kidney beans to salads. Make burritos and tacos with mashed mendoza beans or black beans. Where can you learn more? Go to http://jono-osito.info/. Enter B513 in the search box to learn more about \"DASH Diet: Care Instructions. \" Current as of: March 23, 2016 Content Version: 11.1 © 2761-6314 [employer], [employer]. Care instructions adapted under license by [employer] (which disclaims liability or warranty for this information). If you have questions about a medical condition or this instruction, always ask your healthcare professional. [place] [address] any warranty or liability for your use of this information. Introducing [facility]! Won Edmonds introduces cityguru patient portal. Now you can access parts of your medical record, email your doctor's office, and request medication refills online. 1. In your internet browser, go to [email or web address] 2. Click on the First Time User? Click Here link in the Sign In box. You will see the New Member Sign Up page. 3. Enter your cityguru Access Code exactly as it appears below.  You will not need to use this code after youve completed the sign-up process. If you do not sign up before the expiration date, you must request a new code. · GLOBALDRUM Access Code: [email or web address] Expires: 5/11/2017  9:42 AM 
 
4. Enter the last four digits of your Social Security Number (xxxx) and Date of Birth (mm/dd/yyyy) as indicated and click Submit. You will be taken to the next sign-up page. 5. Create a GLOBALDRUM ID. This will be your GLOBALDRUM login ID and cannot be changed, so think of one that is secure and easy to remember. 6. Create a GLOBALDRUM password. You can change your password at any time. 7. Enter your Password Reset Question and Answer. This can be used at a later time if you forget your password. 8. Enter your e-mail address. You will receive e-mail notification when new information is available in [address] 9. Click Sign Up. You can now view and download portions of your medical record. 10. Click the Download Summary menu link to download a portable copy of your medical information. If you have questions, please visit the Frequently Asked Questions section of the GLOBALDRUM website. Remember, GLOBALDRUM is NOT to be used for urgent needs. For medical emergencies, dial 911. Now available from your iPhone and Android! Please provide this summary of care documentation to your next provider. Your primary care clinician is listed as Rosenda Brooks. If you have any questions after today's visit, please call [phone].

## 2017-03-15 ENCOUNTER — TELEPHONE (OUTPATIENT)
Dept: FAMILY MEDICINE CLINIC | Facility: CLINIC | Age: 57
End: 2017-03-15

## 2017-03-15 NOTE — TELEPHONE ENCOUNTER
Spoke to pharmacist who states Prozac was filed incorrectly by pharmacy and patient did not receive prescription for last month. He will call patient to ask if she would like them now.  Just CHEKO

## 2017-04-25 DIAGNOSIS — I47.1 SVT (SUPRAVENTRICULAR TACHYCARDIA) (HCC): ICD-10-CM

## 2017-04-25 DIAGNOSIS — I10 ESSENTIAL HYPERTENSION: ICD-10-CM

## 2017-04-25 RX ORDER — METOPROLOL SUCCINATE 25 MG/1
12.5 TABLET, EXTENDED RELEASE ORAL 2 TIMES DAILY
Qty: 30 TAB | Refills: 2 | Status: SHIPPED | OUTPATIENT
Start: 2017-04-25 | End: 2017-07-05 | Stop reason: SDUPTHER

## 2017-06-06 ENCOUNTER — OFFICE VISIT (OUTPATIENT)
Dept: FAMILY MEDICINE CLINIC | Facility: CLINIC | Age: 57
End: 2017-06-06

## 2017-06-06 ENCOUNTER — HOSPITAL ENCOUNTER (OUTPATIENT)
Dept: LAB | Age: 57
Discharge: HOME | End: 2017-06-06
Payer: SUBSIDIZED

## 2017-06-06 VITALS
TEMPERATURE: 97.7 F | HEART RATE: 63 BPM | RESPIRATION RATE: 15 BRPM | HEIGHT: 59 IN | OXYGEN SATURATION: 100 % | DIASTOLIC BLOOD PRESSURE: 98 MMHG | WEIGHT: 112.2 LBS | BODY MASS INDEX: 22.62 KG/M2 | SYSTOLIC BLOOD PRESSURE: 196 MMHG

## 2017-06-06 DIAGNOSIS — R01.1 SYSTOLIC MURMUR: ICD-10-CM

## 2017-06-06 DIAGNOSIS — I10 MALIGNANT HYPERTENSION: ICD-10-CM

## 2017-06-06 DIAGNOSIS — Z87.442 H/O RENAL CALCULI: ICD-10-CM

## 2017-06-06 DIAGNOSIS — N12 PYELONEPHRITIS: Primary | ICD-10-CM

## 2017-06-06 DIAGNOSIS — R30.0 BURNING WITH URINATION: ICD-10-CM

## 2017-06-06 DIAGNOSIS — R31.9 HEMATURIA: ICD-10-CM

## 2017-06-06 DIAGNOSIS — R10.9 ACUTE LEFT FLANK PAIN: ICD-10-CM

## 2017-06-06 DIAGNOSIS — E78.2 MIXED HYPERLIPIDEMIA: ICD-10-CM

## 2017-06-06 LAB
BILIRUB UR QL STRIP: NEGATIVE
GLUCOSE UR-MCNC: NEGATIVE MG/DL
KETONES P FAST UR STRIP-MCNC: NEGATIVE MG/DL
PH UR STRIP: 6 [PH] (ref 4.6–8)
PROT UR QL STRIP: NEGATIVE MG/DL
SP GR UR STRIP: 1.01 (ref 1–1.03)
UA UROBILINOGEN AMB POC: NORMAL (ref 0.2–1)
URINALYSIS CLARITY POC: CLEAR
URINALYSIS COLOR POC: YELLOW
URINE BLOOD POC: NORMAL
URINE LEUKOCYTES POC: NEGATIVE
URINE NITRITES POC: NEGATIVE

## 2017-06-06 PROCEDURE — 87086 URINE CULTURE/COLONY COUNT: CPT | Performed by: NURSE PRACTITIONER

## 2017-06-06 NOTE — PROGRESS NOTES
Carmella Ortiz is a 59 y.o.  female and presents with    Chief Complaint   Patient presents with    Urinary Burning    Urinary Hesitancy    Back Pain    Hypertension    Heart Murmur         Subjective:  Mr. Elizabeth Gu presents today for pain with urination for 2 weeks. She has h/o chronic bladder infections. She is having bilateral lower back pain. She states that she has been taking her blood pressure medication metoprolol and HCTZ as directed. She states that she gets very nervous while at the doctor's office. She hasn't seen a Cardiologist since last year. She has many reactions and side effects to many drugs classes for hypotensives. Additional Concerns: NONE        Patient Active Problem List   Diagnosis Code    Essential hypertension I10    SV[facility]cardia) [facility]) I47.1    History of TIA (transient ischemic attack) Z86.73     Patient Active Problem List    Diagnosis Date Noted    Essential hypertension 02/10/2017    SVT (supraventricular tachycardia) ([facility] [address] 02/10/2017    History of TIA (transient ischemic attack) 02/10/2017     Current Outpatient Prescriptions   Medication Sig Dispense Refill    hydroCHLOROthiazide (HYDRODIURIL) 12.5 mg tablet Take 1 Tab by mouth daily. 30 Tab 1    metoprolol succinate (TOPROL-XL) 25 mg XL tablet Take 0.5 Tabs by mouth two (2) times a day. 30 Tab 2    vitamin e (E GEMS) 1,000 unit capsule Take 1,000 Units by mouth daily.  b complex vitamins tablet Take 1 Tab by mouth daily.  MULTIVIT WITH CALCIUM,IRON,MIN [facility] MULTIPLE VITAMINS PO) Take 1 Tab by mouth daily.  aspirin 81 mg chewable tablet Take 81 mg by mouth daily.  ALPRAZolam (XANAX) 0.25 mg tablet Take 1 Tab by mouth daily as needed for Anxiety. 30 Tab 0    FLUoxetine (PROZAC) 10 mg capsule Take 1 Cap by mouth daily.  30 Cap 1     Allergies   Allergen Reactions    Aldomet [Methyldopa] Rash    Benadryl [Diphenhydramine Hcl] Other (comments) incontinence    Cozaar [Losartan] Other (comments)     Neurological issues    Dyazide [Triamterene-Hydrochlorothiazid] Other (comments)     Neurological issues    Levaquin [Levofloxacin] Hives    Norvasc [Amlodipine] Rash and Other (comments)     Neurological issues    Penicillins Rash     Ok to take 2nd generation.  Sulfa (Sulfonamide Antibiotics) Rash    Verapamil Other (comments)     Neurological issues     Past Medical History:   Diagnosis Date    FHx: SVT (supraventricular tachycardia) 05/2016    Fibroid uterus 2011    H/O cyst of breast 1987    Hypertension     TIA (transient ischemic attack) 2014     Past Surgical History:   Procedure Laterality Date    HX BREAST BIOPSY Right 1987    RIGHT HEART CATHETERIZATION      2011     Family History   Problem Relation Age of Onset    Hypertension Other     Cancer Mother 36     Cervical    Stroke Father     Hypertension Father     Heart Disease Father     No Known Problems Sister     Heart Attack Paternal Aunt     No Known Problems Sister      Social History   Substance Use Topics    Smoking status: Never Smoker    Smokeless tobacco: Never Used    Alcohol use No           ROS   History obtained from the patient  General ROS: negative for - chills, fatigue, fever or hot flashes  ENT ROS: negative for - headaches or nasal congestion  Respiratory ROS: no cough, shortness of breath, or wheezing  Cardiovascular ROS: no chest pain or dyspnea on exertion  Gastrointestinal ROS: no abdominal pain, change in bowel habits, or black or bloody stools, positive for left sided flank pain  Genito-Urinary ROS: positive for - dysuria, hematuria  negative for - vulvar/vaginal symptoms    All other systems reviewed and are negative.       Objective:  Vitals:    06/06/17 1301 06/06/17 1305   BP: (!) 200/100 (!) 196/98   Pulse: 63    Resp: 15    Temp: 97.7 °F (36.5 °C)    TempSrc: Oral    SpO2: 100%    Weight: 112 lb 3.2 oz (50.9 kg)    Height: 4' 11\" (1.499 m) PainSc:   1    PainLoc: Back          PHYSICAL EXAM  Alert, well appearing, and in no distress, oriented to person, place, and time and normal appearing weight  Mental status - alert, oriented to person, place, and time, normal mood, behavior, speech, dress, motor activity, and thought processes, affect appropriate to mood  Chest - clear to auscultation, no wheezes, rales or rhonchi, symmetric air entry  Heart - normal rate and regular rhythm, systolic murmur 2/6 at 2nd right intercostal space  Abdomen - bowel sounds normal, no CVA tenderness  peripheral vascular exam pedal pulses normal both DP's and PT's, color, temperature, sensation in feet normal, no lesions      LABS   Urine culture  POC urine dip ordered today  CBC, CMP, lipid, A1C, microalbumin ordered        TESTS  Xray abdomen (KUB) ordered  ECHO ordered        Assessment/Plan:    Pyelonephritis/Acute left flank pain - Rocephin 1G given X1 in office. If s/s worsen present to Ed. Hypertension - poorly controlled, needs further observation, needs improvement, continue BB and thiazide as directed. Labs ordered today. Burning with urination/H/o renal calculi - KUB ordered to r/o stone. Hematuria - Needs repeat UA at next visit. Systolic murmur - ECHO ordered  Hyperlipidemia - Labs today. Last LDL was 139        Lab review: orders written for new lab studies as appropriate; see orders      I have discussed the diagnosis with the patient and the intended plan as seen in the above orders. The patient has received an after-visit summary and questions were answered concerning future plans. I have discussed medication side effects and warnings with the patient as well. I have reviewed the plan of care with the patient, accepted their input and they are in agreement with the treatment goals. Follow-up Disposition:  Return in about 1 month (around 7/6/2017), or if symptoms worsen or fail to improve, for f/u in 1 month for hypertension and results. More than 1/2 of this 30 minute visit was spent in counseling and coordination of care, as described above.       Akiko Mckeon, RN, MSN, FNP-C

## 2017-06-06 NOTE — MR AVS SNAPSHOT
Visit Information Date & Time Provider Department Dept. Phone Encounter #  
 6/6/2017  1:00 PM Ok Torres [facility] [phone] [id number] Follow-up Instructions Return in about 1 month (around 7/6/2017), or if symptoms worsen or fail to improve, for f/u in 1 month for hypertension and results. Upcoming Health Maintenance Date Due  
 BREAST CANCER SCRN MAMMOGRAM 3/8/2008 FOBT Q 1 YEAR AGE 50-75 3/8/2008 INFLUENZA AGE 9 TO ADULT 8/1/2017 PAP AKA CERVICAL CYTOLOGY 3/1/2019 DTaP/Tdap/Td series (2 - Td) 1/1/2026 Allergies as of 6/6/2017  Review Complete On: 6/6/2017 By: Ok Torres NP Severity Noted Reaction Type Reactions Aldomet [Methyldopa]  02/10/2017    Rash Benadryl [Diphenhydramine Hcl]  02/10/2017    Other (comments)  
 incontinence Cozaar [Losartan]  02/10/2017   Intolerance Other (comments) Neurological issues Dyazide [Triamterene-hydrochlorothiazid]  02/10/2017   Intolerance Other (comments) Neurological issues Levaquin [Levofloxacin]  02/10/2017   Intolerance Hives Norvasc [Amlodipine]  02/10/2017   Intolerance Rash, Other (comments) Neurological issues Penicillins  02/10/2017    Rash Ok to take 2nd generation. Sulfa (Sulfonamide Antibiotics)  02/10/2017   Side Effect Rash Verapamil  02/10/2017   Intolerance Other (comments) Neurological issues Current Immunizations  Never Reviewed No immunizations on file. Not reviewed this visit You Were Diagnosed With   
  
 Codes Comments Pyelonephritis    -  Primary ICD-10-CM: N12 
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 
ICD-9-CM: 401.0 Burning with urination     ICD-10-CM: R30.0 ICD-9-CM: 788.1 H/O renal calculi     ICD-10-CM: Z87.442 ICD-9-CM: V13.01 Acute left flank pain     ICD-10-CM: R10.9 ICD-9-CM: 789.09, 338.19 Hematuria     ICD-10-CM: R31.9 ICD-9-CM: 599.70   
 Systolic murmur     NBE-44-PH: R01.1 ICD-9-CM: 785.2 Mixed hyperlipidemia     ICD-10-CM: E78.2 ICD-9-CM: 272.2 Vitals BP Pulse Temp Resp Height(growth percentile) Weight(growth percentile) (!) 196/98 (BP 1 Location: Left arm, BP Patient Position: Sitting) 63 97.7 °F (36.5 °C) (Oral) 15 4' 11\" (1.499 m) 112 lb 3.2 oz (50.9 kg) SpO2 BMI OB Status Smoking Status 100% 22.66 kg/m2 Menopause Never Smoker Vitals History BMI and BSA Data Body Mass Index Body Surface Area  
 22.66 kg/m 2 1.46 m 2 Preferred Pharmacy Pharmacy Name Phone [id number]- [address], [address] [phone] Your Updated Medication List  
  
   
This list is accurate as of: 6/6/17  2:16 PM.  Always use your most recent med list.  
  
  
  
  
 ALPRAZolam 0.25 mg tablet Commonly known as:  Rosado Poke Take 1 Tab by mouth daily as needed for Anxiety. aspirin 81 mg chewable tablet Take 81 mg by mouth daily. b complex vitamins tablet Take 1 Tab by mouth daily. FLUoxetine 10 mg capsule Commonly known as:  PROzac Take 1 Cap by mouth daily. hydroCHLOROthiazide 12.5 mg tablet Commonly known as:  HYDRODIURIL Take 1 Tab by mouth daily. metoprolol succinate 25 mg XL tablet Commonly known as:  TOPROL-XL Take 0.5 Tabs by mouth two (2) times a day. vitamin e 1,000 unit capsule Commonly known as:  E GEMS Take 1,000 Units by mouth daily. WOMEN'S MULTIPLE VITAMINS PO Take 1 Tab by mouth daily. We Performed the Following AMB POC URINALYSIS DIP STICK AUTO W/ MICRO [16127 CPT(R)] CEFTRIAXONE SODIUM INJECTION  MG [ [facility] [place]] Comments:  
 Mix per protocol MT THER/PROPH/DIAG INJECTION, SUBCUT/IM [id number] CPT(R)] Follow-up Instructions Return in about 1 month (around 7/6/2017), or if symptoms worsen or fail to improve, for f/u in 1 month for hypertension and results. To-Do List   
 06/06/2017 ECHO:  2D ECHO COMPLETE ADULT (TTE) W OR WO CONTR   
  
 06/06/2017 Lab:  CBC WITH AUTOMATED DIFF   
  
 06/06/2017 Lab:  LIPID PANEL   
  
 06/06/2017 Lab:  METABOLIC PANEL, COMPREHENSIVE   
  
 06/06/2017 Lab:  MICROALBUMIN, UR, RAND W/ MICROALBUMIN/CREA RATIO   
  
 06/06/2017 Imaging:  XR ABD (KUB) Patient Instructions Heart Murmur: Care Instructions Your Care Instructions A heart murmur is a blowing, whooshing, or rasping sound made by blood moving through the heart or the blood vessels near the heart. Murmurs can be heard through a stethoscope. Heart murmurs can occur during pregnancy or during a temporary illness, such as a fever. These murmurs usually are not a problem and go away on their own. However, sometimes a heart murmur is a sign of a serious problem, such as congenital heart disease or heart valve problems, that may need treatment. You may need more tests to check your heart. The treatment depends on the specific heart problem causing the murmur. Follow-up care is a key part of your treatment and safety. Be sure to make and go to all appointments, and call your doctor if you are having problems. It's also a good idea to know your test results and keep a list of the medicines you take. How can you care for yourself at home? · Take your medicines exactly as prescribed. Call your doctor if you think you are having a problem with your medicine. You will get more details on the specific medicines your doctor prescribes. · If your doctor recommends it, limit over-the-counter medicines that have stimulants in them. These include decongestants and cold and flu medicines. · If your doctor recommends it, get more exercise. Walking is a good choice. Bit by bit, increase the amount you walk every day. Try for 30 minutes on most days of the week. You also may want to swim, bike, or do other activities. · Do not smoke. Smoking increases your risk of heart attack and stroke. If you need help quitting, talk to your doctor about stop-smoking programs and medicines. These can increase your chances of quitting for good. · Limit alcohol to 2 drinks a day for men and 1 drink a day for women. Alcohol may interfere with some of your medicines. When should you call for help? Call 911 anytime you think you may need emergency care. For example, call if: 
· You have severe trouble breathing. · You cough up pink, foamy mucus and you have trouble breathing. · You passed out (lost consciousness). · You have a seizure. · You have symptoms of a stroke. These may include: 
¨ Sudden numbness, tingling, weakness, or loss of movement in your face, arm, or leg, especially on only one side of your body. ¨ Sudden vision changes. ¨ Sudden trouble speaking. ¨ Sudden confusion or trouble understanding simple statements. ¨ Sudden problems with walking or balance. ¨ A sudden, severe headache that is different from past headaches. Call your doctor now or seek immediate medical care if: 
· You have new or increased shortness of breath. · You feel dizzy or lightheaded, or you feel like you may faint. · You gain 2 to 3 pounds or more over 2 days. · You have increased swelling in your legs or feet. · You have a fever. Watch closely for changes in your health, and be sure to contact your doctor if you have any problems. Where can you learn more? Go to http://jono-osito.info/. Keli Navarro in the search box to learn more about \"Heart Murmur: Care Instructions. \" Current as of: January 27, 2016 Content Version: 11.2 © 1108-0172 [employer]. Care instructions adapted under license by [employer] (which disclaims liability or warranty for this information).  If you have questions about a medical condition or this instruction, always ask your healthcare professional. [employer] disclaims any warranty or liability for your use of this information. High Cholesterol: Care Instructions Your Care Instructions Cholesterol is a type of fat in your blood. It is needed for many body functions, such as making new cells. Cholesterol is made by your body. It also comes from food you eat. High cholesterol means that you have too much of the fat in your blood. This raises your risk of a heart attack and stroke. LDL and HDL are part of your total cholesterol. LDL is the \"bad\" cholesterol. High LDL can raise your risk for heart disease, heart attack, and stroke. HDL is the \"good\" cholesterol. It helps clear bad cholesterol from the body. High HDL is linked with a lower risk of heart disease, heart attack, and stroke. Your cholesterol levels help your doctor find out your risk for having a heart attack or stroke. You and your doctor can talk about whether you need to lower your risk and what treatment is best for you. A heart-healthy lifestyle along with medicines can help lower your cholesterol and your risk. The way you choose to lower your risk will depend on how high your risk is for heart attack and stroke. It will also depend on how you feel about taking medicines. Follow-up care is a key part of your treatment and safety. Be sure to make and go to all appointments, and call your doctor if you are having problems. It's also a good idea to know your test results and keep a list of the medicines you take. How can you care for yourself at home? · Eat a variety of foods every day. Good choices include fruits, vegetables, whole grains (like oatmeal), dried beans and peas, nuts and seeds, soy products (like tofu), and fat-free or low-fat dairy products. · Replace butter, margarine, and hydrogenated or partially hydrogenated oils with olive and canola oils. (Canola oil margarine without trans fat is fine.) · Replace red meat with fish, poultry, and soy protein (like tofu). · Limit processed and packaged foods like chips, crackers, and cookies. · Bake, broil, or steam foods. Don't woods them. · Be physically active. Get at least 30 minutes of exercise on most days of the week. Walking is a good choice. You also may want to do other activities, such as running, swimming, cycling, or playing tennis or team sports. · Stay at a healthy weight or lose weight by making the changes in eating and physical activity listed above. Losing just a small amount of weight, even 5 to 10 pounds, can reduce your risk for having a heart attack or stroke. · Do not smoke. When should you call for help? Watch closely for changes in your health, and be sure to contact your doctor if: 
· You need help making lifestyle changes. · You have questions about your medicine. Where can you learn more? Go to http://jonoArtSettersosito.info/. Enter Q792 in the search box to learn more about \"High Cholesterol: Care Instructions. \" Current as of: January 27, 2016 Content Version: 11.2 © 9929-8049 [employer]. Care instructions adapted under license by [employer] (which disclaims liability or warranty for this information). If you have questions about a medical condition or this instruction, always ask your healthcare professional. [address] any warranty or liability for your use of this information. High Blood Pressure: Care Instructions Your Care Instructions If your blood pressure is usually above 140/90, you have high blood pressure, or hypertension. That means the top number is 140 or higher or the bottom number is 90 or higher, or both. Despite what a lot of people think, high blood pressure usually doesn't cause headaches or make you feel dizzy or lightheaded. It usually has no symptoms. But it does increase your risk for heart attack, stroke, and kidney or eye damage. The higher your blood pressure, the more your risk increases. Your doctor will give you a goal for your blood pressure. Your goal will be based on your health and your age. An example of a goal is to keep your blood pressure below 140/90. Lifestyle changes, such as eating healthy and being active, are always important to help lower blood pressure. You might also take medicine to reach your blood pressure goal. 
Follow-up care is a key part of your treatment and safety. Be sure to make and go to all appointments, and call your doctor if you are having problems. It's also a good idea to know your test results and keep a list of the medicines you take. How can you care for yourself at home? Medical treatment · If you stop taking your medicine, your blood pressure will go back up. You may take one or more types of medicine to lower your blood pressure. Be safe with medicines. Take your medicine exactly as prescribed. Call your doctor if you think you are having a problem with your medicine. · Talk to your doctor before you start taking aspirin every day. Aspirin can help certain people lower their risk of a heart attack or stroke. But taking aspirin isn't right for everyone, because it can cause serious bleeding. · See your doctor regularly. You may need to see the doctor more often at first or until your blood pressure comes down. · If you are taking blood pressure medicine, talk to your doctor before you take decongestants or anti-inflammatory medicine, such as ibuprofen. Some of these medicines can raise blood pressure. · Learn how to check your blood pressure at home. Lifestyle changes · Stay at a healthy weight. This is especially important if you put on weight around the waist. Losing even 10 pounds can help you lower your blood pressure. · If your doctor recommends it, get more exercise. Walking is a good choice. Bit by bit, increase the amount you walk every day. Try for at least 30 minutes on most days of the week.  You also may want to swim, bike, or do other activities. · Avoid or limit alcohol. Talk to your doctor about whether you can drink any alcohol. · Try to limit how much sodium you eat to less than 2,300 milligrams (mg) a day. Your doctor may ask you to try to eat less than 1,500 mg a day. · Eat plenty of fruits (such as bananas and oranges), vegetables, legumes, whole grains, and low-fat dairy products. · Lower the amount of saturated fat in your diet. Saturated fat is found in animal products such as milk, cheese, and meat. Limiting these foods may help you lose weight and also lower your risk for heart disease. · Do not smoke. Smoking increases your risk for heart attack and stroke. If you need help quitting, talk to your doctor about stop-smoking programs and medicines. These can increase your chances of quitting for good. When should you call for help? Call 911 anytime you think you may need emergency care. This may mean having symptoms that suggest that your blood pressure is causing a serious heart or blood vessel problem. Your blood pressure may be over 180/110. For example, call 911 if: 
· You have symptoms of a heart attack. These may include: ¨ Chest pain or pressure, or a strange feeling in the chest. 
¨ Sweating. ¨ Shortness of breath. ¨ Nausea or vomiting. ¨ Pain, pressure, or a strange feeling in the back, neck, jaw, or upper belly or in one or both shoulders or arms. ¨ Lightheadedness or sudden weakness. ¨ A fast or irregular heartbeat. · You have symptoms of a stroke. These may include: 
¨ Sudden numbness, tingling, weakness, or loss of movement in your face, arm, or leg, especially on only one side of your body. ¨ Sudden vision changes. ¨ Sudden trouble speaking. ¨ Sudden confusion or trouble understanding simple statements. ¨ Sudden problems with walking or balance. ¨ A sudden, severe headache that is different from past headaches. · You have severe back or belly pain. Do not wait until your blood pressure comes down on its own. Get help right away. Call your doctor now or seek immediate care if: 
· Your blood pressure is much higher than normal (such as 180/110 or higher), but you don't have symptoms. · You think high blood pressure is causing symptoms, such as: ¨ Severe headache. ¨ Blurry vision. Watch closely for changes in your health, and be sure to contact your doctor if: 
· Your blood pressure measures 140/90 or higher at least 2 times. That means the top number is 140 or higher or the bottom number is 90 or higher, or both. · You think you may be having side effects from your blood pressure medicine. · Your blood pressure is usually normal, but it goes above normal at least 2 times. Where can you learn more? Go to http://jono-osito.info/. Enter N893 in the search box to learn more about \"High Blood Pressure: Care Instructions. \" Current as of: August 8, 2016 Content Version: 11.2 © 9571-4712 [email or web address]. Care instructions adapted under license by [employer] (which disclaims liability or warranty for this information). If you have questions about a medical condition or this instruction, always ask your healthcare professional. [place] [address] any warranty or liability for your use of this information. Low Sodium Diet (2,000 Milligram): Care Instructions Your Care Instructions Too much sodium causes your body to hold on to extra water. This can raise your blood pressure and force your heart and kidneys to work harder. In very serious cases, this could cause you to be put in the hospital. It might even be life-threatening. By limiting sodium, you will feel better and lower your risk of serious problems. The most common source of sodium is salt. People get most of the salt in their diet from canned, prepared, and packaged foods.  Fast food and restaurant meals also are very high in sodium. Your doctor will probably limit your sodium to less than 2,000 milligrams (mg) a day. This limit counts all the sodium in prepared and packaged foods and any salt you add to your food. Follow-up care is a key part of your treatment and safety. Be sure to make and go to all appointments, and call your doctor if you are having problems. It's also a good idea to know your test results and keep a list of the medicines you take. How can you care for yourself at home? Read food labels · Read labels on cans and food packages. The labels tell you how much sodium is in each serving. Make sure that you look at the serving size. If you eat more than the serving size, you have eaten more sodium. · Food labels also tell you the Percent Daily Value for sodium. Choose products with low Percent Daily Values for sodium. · Be aware that sodium can come in forms other than salt, including monosodium glutamate (MSG), sodium citrate, and sodium bicarbonate (baking soda). MSG is often added to Asian food. When you eat out, you can sometimes ask for food without MSG or added salt. Buy low-sodium foods · Buy foods that are labeled \"unsalted\" (no salt added), \"sodium-free\" (less than 5 mg of sodium per serving), or \"low-sodium\" (less than 140 mg of sodium per serving). Foods labeled \"reduced-sodium\" and \"light sodium\" may still have too much sodium. Be sure to read the label to see how much sodium you are getting. · Buy fresh vegetables, or frozen vegetables without added sauces. Buy low-sodium versions of canned vegetables, soups, and other canned goods. Prepare low-sodium meals · Cut back on the amount of salt you use in cooking. This will help you adjust to the taste. Do not add salt after cooking. One teaspoon of salt has about 2,300 mg of sodium. · Take the salt shaker off the table.  
· Flavor your food with garlic, lemon juice, onion, vinegar, herbs, and spices. Do not use soy sauce, lite soy sauce, steak sauce, onion salt, garlic salt, celery salt, mustard, or ketchup on your food. · Use low-sodium salad dressings, sauces, and ketchup. Or make your own salad dressings and sauces without adding salt. · Use less salt (or none) when recipes call for it. You can often use half the salt a recipe calls for without losing flavor. Other foods such as rice, pasta, and grains do not need added salt. · Rinse canned vegetables, and cook them in fresh water. This removes somebut not allof the salt. · Avoid water that is naturally high in sodium or that has been treated with water softeners, which add sodium. Call your local water company to find out the sodium content of your water supply. If you buy bottled water, read the label and choose a sodium-free brand. Avoid high-sodium foods · Avoid eating: ¨ Smoked, cured, salted, and canned meat, fish, and poultry. ¨ Ham, mills, hot dogs, and luncheon meats. ¨ Regular, hard, and processed cheese and regular peanut butter. ¨ Crackers with salted tops, and other salted snack foods such as pretzels, chips, and salted popcorn. ¨ Frozen prepared meals, unless labeled low-sodium. ¨ Canned and dried soups, broths, and bouillon, unless labeled sodium-free or low-sodium. ¨ Canned vegetables, unless labeled sodium-free or low-sodium. ¨ Amy Harrow fries, pizza, tacos, and other fast foods. ¨ Pickles, olives, ketchup, and other condiments, especially soy sauce, unless labeled sodium-free or low-sodium. Where can you learn more? Go to http://jono-osito.info/. Enter G561 in the search box to learn more about \"Low Sodium Diet (2,000 Milligram): Care Instructions. \" Current as of: July 26, 2016 Content Version: 11.2 © 0936-9856 [employer].  Care instructions adapted under license by [employer] (which disclaims liability or warranty for this information). If you have questions about a medical condition or this instruction, always ask your healthcare professional. [place] [address] any warranty or liability for your use of this information. Introducing [facility]! [address] [facility] introduces Check-Cap patient portal. Now you can access parts of your medical record, email your doctor's office, and request medication refills online. 1. In your internet browser, go to [email or web address] 2. Click on the First Time User? Click Here link in the Sign In box. You will see the New Member Sign Up page. 3. Enter your Check-Cap Access Code exactly as it appears below. You will not need to use this code after youve completed the sign-up process. If you do not sign up before the expiration date, you must request a new code. · Check-Cap Access Code: [email or web address] Expires: 9/4/2017  2:16 PM 
 
4. Enter the last four digits of your Social Security Number (xxxx) and Date of Birth (mm/dd/yyyy) as indicated and click Submit. You will be taken to the next sign-up page. 5. Create a Check-Cap ID. This will be your Check-Cap login ID and cannot be changed, so think of one that is secure and easy to remember. 6. Create a Check-Cap password. You can change your password at any time. 7. Enter your Password Reset Question and Answer. This can be used at a later time if you forget your password. 8. Enter your e-mail address. You will receive e-mail notification when new information is available in [address] 9. Click Sign Up. You can now view and download portions of your medical record. 10. Click the Download Summary menu link to download a portable copy of your medical information. If you have questions, please visit the Frequently Asked Questions section of the Check-Cap website. Remember, Check-Cap is NOT to be used for urgent needs. For medical emergencies, dial 911. Now available from your iPhone and Android! Please provide this summary of care documentation to your next provider. Your primary care clinician is listed as Tello Garcia. If you have any questions after today's visit, please call [phone].

## 2017-06-06 NOTE — PATIENT INSTRUCTIONS
Heart Murmur: Care Instructions  Your Care Instructions    A heart murmur is a blowing, whooshing, or rasping sound made by blood moving through the heart or the blood vessels near the heart. Murmurs can be heard through a stethoscope. Heart murmurs can occur during pregnancy or during a temporary illness, such as a fever. These murmurs usually are not a problem and go away on their own. However, sometimes a heart murmur is a sign of a serious problem, such as congenital heart disease or heart valve problems, that may need treatment. You may need more tests to check your heart. The treatment depends on the specific heart problem causing the murmur. Follow-up care is a key part of your treatment and safety. Be sure to make and go to all appointments, and call your doctor if you are having problems. It's also a good idea to know your test results and keep a list of the medicines you take. How can you care for yourself at home? · Take your medicines exactly as prescribed. Call your doctor if you think you are having a problem with your medicine. You will get more details on the specific medicines your doctor prescribes. · If your doctor recommends it, limit over-the-counter medicines that have stimulants in them. These include decongestants and cold and flu medicines. · If your doctor recommends it, get more exercise. Walking is a good choice. Bit by bit, increase the amount you walk every day. Try for 30 minutes on most days of the week. You also may want to swim, bike, or do other activities. · Do not smoke. Smoking increases your risk of heart attack and stroke. If you need help quitting, talk to your doctor about stop-smoking programs and medicines. These can increase your chances of quitting for good. · Limit alcohol to 2 drinks a day for men and 1 drink a day for women. Alcohol may interfere with some of your medicines. When should you call for help?   Call 911 anytime you think you may need emergency care. For example, call if:  · You have severe trouble breathing. · You cough up pink, foamy mucus and you have trouble breathing. · You passed out (lost consciousness). · You have a seizure. · You have symptoms of a stroke. These may include:  ¨ Sudden numbness, tingling, weakness, or loss of movement in your face, arm, or leg, especially on only one side of your body. ¨ Sudden vision changes. ¨ Sudden trouble speaking. ¨ Sudden confusion or trouble understanding simple statements. ¨ Sudden problems with walking or balance. ¨ A sudden, severe headache that is different from past headaches. Call your doctor now or seek immediate medical care if:  · You have new or increased shortness of breath. · You feel dizzy or lightheaded, or you feel like you may faint. · You gain 2 to 3 pounds or more over 2 days. · You have increased swelling in your legs or feet. · You have a fever. Watch closely for changes in your health, and be sure to contact your doctor if you have any problems. Where can you learn more? Go to http://jono-osito.info/. Wolf Sandoval in the search box to learn more about \"Heart Murmur: Care Instructions. \"  Current as of: January 27, 2016  Content Version: 11.2  © 7805-7427 [employer]. Care instructions adapted under license by [employer]® (which disclaims liability or warranty for this information). If you have questions about a medical condition or this instruction, always ask your healthcare professional. [place] [address] any warranty or liability for your use of this information. High Cholesterol: Care Instructions  Your Care Instructions  Cholesterol is a type of fat in your blood. It is needed for many body functions, such as making new cells. Cholesterol is made by your body. It also comes from food you eat. High cholesterol means that you have too much of the fat in your blood.  This raises your risk of a heart attack and stroke. LDL and HDL are part of your total cholesterol. LDL is the \"bad\" cholesterol. High LDL can raise your risk for heart disease, heart attack, and stroke. HDL is the \"good\" cholesterol. It helps clear bad cholesterol from the body. High HDL is linked with a lower risk of heart disease, heart attack, and stroke. Your cholesterol levels help your doctor find out your risk for having a heart attack or stroke. You and your doctor can talk about whether you need to lower your risk and what treatment is best for you. A heart-healthy lifestyle along with medicines can help lower your cholesterol and your risk. The way you choose to lower your risk will depend on how high your risk is for heart attack and stroke. It will also depend on how you feel about taking medicines. Follow-up care is a key part of your treatment and safety. Be sure to make and go to all appointments, and call your doctor if you are having problems. It's also a good idea to know your test results and keep a list of the medicines you take. How can you care for yourself at home? · Eat a variety of foods every day. Good choices include fruits, vegetables, whole grains (like oatmeal), dried beans and peas, nuts and seeds, soy products (like tofu), and fat-free or low-fat dairy products. · Replace butter, margarine, and hydrogenated or partially hydrogenated oils with olive and canola oils. (Canola oil margarine without trans fat is fine.)  · Replace red meat with fish, poultry, and soy protein (like tofu). · Limit processed and packaged foods like chips, crackers, and cookies. · Bake, broil, or steam foods. Don't woods them. · Be physically active. Get at least 30 minutes of exercise on most days of the week. Walking is a good choice. You also may want to do other activities, such as running, swimming, cycling, or playing tennis or team sports.   · Stay at a healthy weight or lose weight by making the changes in eating and physical activity listed above. Losing just a small amount of weight, even 5 to 10 pounds, can reduce your risk for having a heart attack or stroke. · Do not smoke. When should you call for help? Watch closely for changes in your health, and be sure to contact your doctor if:  · You need help making lifestyle changes. · You have questions about your medicine. Where can you learn more? Go to http://jono-osito.info/. Enter P679 in the search box to learn more about \"High Cholesterol: Care Instructions. \"  Current as of: January 27, 2016  Content Version: 11.2  © 8202-5015 [employer]. Care instructions adapted under license by [employer] (which disclaims liability or warranty for this information). If you have questions about a medical condition or this instruction, always ask your healthcare professional. [address] any warranty or liability for your use of this information. High Blood Pressure: Care Instructions  Your Care Instructions  If your blood pressure is usually above 140/90, you have high blood pressure, or hypertension. That means the top number is 140 or higher or the bottom number is 90 or higher, or both. Despite what a lot of people think, high blood pressure usually doesn't cause headaches or make you feel dizzy or lightheaded. It usually has no symptoms. But it does increase your risk for heart attack, stroke, and kidney or eye damage. The higher your blood pressure, the more your risk increases. Your doctor will give you a goal for your blood pressure. Your goal will be based on your health and your age. An example of a goal is to keep your blood pressure below 140/90. Lifestyle changes, such as eating healthy and being active, are always important to help lower blood pressure. You might also take medicine to reach your blood pressure goal.  Follow-up care is a key part of your treatment and safety.  Be sure to make and go to all appointments, and call your doctor if you are having problems. It's also a good idea to know your test results and keep a list of the medicines you take. How can you care for yourself at home? Medical treatment  · If you stop taking your medicine, your blood pressure will go back up. You may take one or more types of medicine to lower your blood pressure. Be safe with medicines. Take your medicine exactly as prescribed. Call your doctor if you think you are having a problem with your medicine. · Talk to your doctor before you start taking aspirin every day. Aspirin can help certain people lower their risk of a heart attack or stroke. But taking aspirin isn't right for everyone, because it can cause serious bleeding. · See your doctor regularly. You may need to see the doctor more often at first or until your blood pressure comes down. · If you are taking blood pressure medicine, talk to your doctor before you take decongestants or anti-inflammatory medicine, such as ibuprofen. Some of these medicines can raise blood pressure. · Learn how to check your blood pressure at home. Lifestyle changes  · Stay at a healthy weight. This is especially important if you put on weight around the waist. Losing even 10 pounds can help you lower your blood pressure. · If your doctor recommends it, get more exercise. Walking is a good choice. Bit by bit, increase the amount you walk every day. Try for at least 30 minutes on most days of the week. You also may want to swim, bike, or do other activities. · Avoid or limit alcohol. Talk to your doctor about whether you can drink any alcohol. · Try to limit how much sodium you eat to less than 2,300 milligrams (mg) a day. Your doctor may ask you to try to eat less than 1,500 mg a day. · Eat plenty of fruits (such as bananas and oranges), vegetables, legumes, whole grains, and low-fat dairy products. · Lower the amount of saturated fat in your diet.  Saturated fat is found in animal products such as milk, cheese, and meat. Limiting these foods may help you lose weight and also lower your risk for heart disease. · Do not smoke. Smoking increases your risk for heart attack and stroke. If you need help quitting, talk to your doctor about stop-smoking programs and medicines. These can increase your chances of quitting for good. When should you call for help? Call 911 anytime you think you may need emergency care. This may mean having symptoms that suggest that your blood pressure is causing a serious heart or blood vessel problem. Your blood pressure may be over 180/110. For example, call 911 if:  · You have symptoms of a heart attack. These may include:  ¨ Chest pain or pressure, or a strange feeling in the chest.  ¨ Sweating. ¨ Shortness of breath. ¨ Nausea or vomiting. ¨ Pain, pressure, or a strange feeling in the back, neck, jaw, or upper belly or in one or both shoulders or arms. ¨ Lightheadedness or sudden weakness. ¨ A fast or irregular heartbeat. · You have symptoms of a stroke. These may include:  ¨ Sudden numbness, tingling, weakness, or loss of movement in your face, arm, or leg, especially on only one side of your body. ¨ Sudden vision changes. ¨ Sudden trouble speaking. ¨ Sudden confusion or trouble understanding simple statements. ¨ Sudden problems with walking or balance. ¨ A sudden, severe headache that is different from past headaches. · You have severe back or belly pain. Do not wait until your blood pressure comes down on its own. Get help right away. Call your doctor now or seek immediate care if:  · Your blood pressure is much higher than normal (such as 180/110 or higher), but you don't have symptoms. · You think high blood pressure is causing symptoms, such as:  ¨ Severe headache. ¨ Blurry vision.   Watch closely for changes in your health, and be sure to contact your doctor if:  · Your blood pressure measures 140/90 or higher at least 2 times. That means the top number is 140 or higher or the bottom number is 90 or higher, or both. · You think you may be having side effects from your blood pressure medicine. · Your blood pressure is usually normal, but it goes above normal at least 2 times. Where can you learn more? Go to http://jono-osito.info/. Enter V221 in the search box to learn more about \"High Blood Pressure: Care Instructions. \"  Current as of: August 8, 2016  Content Version: 11.2  © 4435-6991 [employer]. Care instructions adapted under license by [employer] (which disclaims liability or warranty for this information). If you have questions about a medical condition or this instruction, always ask your healthcare professional. [address] any warranty or liability for your use of this information. Low Sodium Diet (2,000 Milligram): Care Instructions  Your Care Instructions  Too much sodium causes your body to hold on to extra water. This can raise your blood pressure and force your heart and kidneys to work harder. In very serious cases, this could cause you to be put in the hospital. It might even be life-threatening. By limiting sodium, you will feel better and lower your risk of serious problems. The most common source of sodium is salt. People get most of the salt in their diet from canned, prepared, and packaged foods. Fast food and restaurant meals also are very high in sodium. Your doctor will probably limit your sodium to less than 2,000 milligrams (mg) a day. This limit counts all the sodium in prepared and packaged foods and any salt you add to your food. Follow-up care is a key part of your treatment and safety. Be sure to make and go to all appointments, and call your doctor if you are having problems. It's also a good idea to know your test results and keep a list of the medicines you take. How can you care for yourself at home?   Read food labels  · Read labels on cans and food packages. The labels tell you how much sodium is in each serving. Make sure that you look at the serving size. If you eat more than the serving size, you have eaten more sodium. · Food labels also tell you the Percent Daily Value for sodium. Choose products with low Percent Daily Values for sodium. · Be aware that sodium can come in forms other than salt, including monosodium glutamate (MSG), sodium citrate, and sodium bicarbonate (baking soda). MSG is often added to Asian food. When you eat out, you can sometimes ask for food without MSG or added salt. Buy low-sodium foods  · Buy foods that are labeled \"unsalted\" (no salt added), \"sodium-free\" (less than 5 mg of sodium per serving), or \"low-sodium\" (less than 140 mg of sodium per serving). Foods labeled \"reduced-sodium\" and \"light sodium\" may still have too much sodium. Be sure to read the label to see how much sodium you are getting. · Buy fresh vegetables, or frozen vegetables without added sauces. Buy low-sodium versions of canned vegetables, soups, and other canned goods. Prepare low-sodium meals  · Cut back on the amount of salt you use in cooking. This will help you adjust to the taste. Do not add salt after cooking. One teaspoon of salt has about 2,300 mg of sodium. · Take the salt shaker off the table. · Flavor your food with garlic, lemon juice, onion, vinegar, herbs, and spices. Do not use soy sauce, lite soy sauce, steak sauce, onion salt, garlic salt, celery salt, mustard, or ketchup on your food. · Use low-sodium salad dressings, sauces, and ketchup. Or make your own salad dressings and sauces without adding salt. · Use less salt (or none) when recipes call for it. You can often use half the salt a recipe calls for without losing flavor. Other foods such as rice, pasta, and grains do not need added salt. · Rinse canned vegetables, and cook them in fresh water.  This removes some--but not all--of the salt.  · Avoid water that is naturally high in sodium or that has been treated with water softeners, which add sodium. Call your local water company to find out the sodium content of your water supply. If you buy bottled water, read the label and choose a sodium-free brand. Avoid high-sodium foods  · Avoid eating:  ¨ Smoked, cured, salted, and canned meat, fish, and poultry. ¨ Ham, mills, hot dogs, and luncheon meats. ¨ Regular, hard, and processed cheese and regular peanut butter. ¨ Crackers with salted tops, and other salted snack foods such as pretzels, chips, and salted popcorn. ¨ Frozen prepared meals, unless labeled low-sodium. ¨ Canned and dried soups, broths, and bouillon, unless labeled sodium-free or low-sodium. ¨ Canned vegetables, unless labeled sodium-free or low-sodium. ¨ Valiant Dom fries, pizza, tacos, and other fast foods. ¨ Pickles, olives, ketchup, and other condiments, especially soy sauce, unless labeled sodium-free or low-sodium. Where can you learn more? Go to http://jono-osito.info/. Enter V839 in the search box to learn more about \"Low Sodium Diet (2,000 Milligram): Care Instructions. \"  Current as of: July 26, 2016  Content Version: 11.2  © 7669-6081 [employer]. Care instructions adapted under license by [employer] (which disclaims liability or warranty for this information). If you have questions about a medical condition or this instruction, always ask your healthcare professional. [place] [address] any warranty or liability for your use of this information.

## 2017-06-06 NOTE — PROGRESS NOTES
Jarod Guerra is a 59 y.o.  female presents today for same day sick visit for burning upon urination and back pain for two weeks. Pt is in Room # 2.      1. Have you been to the ER, urgent care clinic since your last visit? Hospitalized since your last visit? No    2. Have you seen or consulted any other health care providers outside of the [address] [place] [address] since your last visit? Include any pap smears or colon screening. No    VORB: Amb POC Urinalysis/Stephanie Turner NP/DESTINEY Montoya LPN      Health Maintenance reviewed - patient asked to schedule her mammogram, patient asked to schedule FOBT.       Vitals:    06/06/17 1301 06/06/17 1305   BP: (!) 200/100 (!) 196/98  Comment: manual   BP 1 Location: Left arm Left arm   BP Patient Position: Sitting Sitting   Pulse: 63    Resp: 15    Temp: 97.7 °F (36.5 °C)    TempSrc: Oral    SpO2: 100%    Weight: 112 lb 3.2 oz (50.9 kg)    Height: 4' 11\" (1.499 m)      BP recheck 178/82 at 1:25 PM

## 2017-06-07 ENCOUNTER — HOSPITAL ENCOUNTER (OUTPATIENT)
Dept: LAB | Age: 57
Discharge: HOME | End: 2017-06-07
Payer: SUBSIDIZED

## 2017-06-07 DIAGNOSIS — I10 MALIGNANT HYPERTENSION: ICD-10-CM

## 2017-06-07 DIAGNOSIS — E78.2 MIXED HYPERLIPIDEMIA: ICD-10-CM

## 2017-06-07 LAB
ALBUMIN SERPL BCP-MCNC: 4.1 G/DL (ref 3.4–5)
ALBUMIN/GLOB SERPL: 1.3 {RATIO} (ref 0.8–1.7)
ALP SERPL-CCNC: 82 U/L (ref 45–117)
ALT SERPL-CCNC: 20 U/L (ref 13–56)
ANION GAP BLD CALC-SCNC: 11 MMOL/L (ref 3–18)
AST SERPL W P-5'-P-CCNC: 21 U/L (ref 15–37)
BASOPHILS # BLD AUTO: 0 K/UL (ref 0–0.06)
BASOPHILS # BLD: 1 % (ref 0–2)
BILIRUB SERPL-MCNC: 0.4 MG/DL (ref 0.2–1)
BUN SERPL-MCNC: 15 MG/DL (ref 7–18)
BUN/CREAT SERPL: 16 (ref 12–20)
CALCIUM SERPL-MCNC: 9.5 MG/DL (ref 8.5–10.1)
CHLORIDE SERPL-SCNC: 103 MMOL/L (ref 100–108)
CHOLEST SERPL-MCNC: 253 MG/DL
CO2 SERPL-SCNC: 26 MMOL/L (ref 21–32)
CREAT SERPL-MCNC: 0.91 MG/DL (ref 0.6–1.3)
DIFFERENTIAL METHOD BLD: ABNORMAL
EOSINOPHIL # BLD: 0.1 K/UL (ref 0–0.4)
EOSINOPHIL NFR BLD: 2 % (ref 0–5)
ERYTHROCYTE [DISTWIDTH] IN BLOOD BY AUTOMATED COUNT: 13.1 % (ref 11.6–14.5)
GLOBULIN SER CALC-MCNC: 3.1 G/DL (ref 2–4)
GLUCOSE SERPL-MCNC: 89 MG/DL (ref 74–99)
HCT VFR BLD AUTO: 39.6 % (ref 35–45)
HDLC SERPL-MCNC: 87 MG/DL (ref 40–60)
HDLC SERPL: 2.9 {RATIO} (ref 0–5)
HGB BLD-MCNC: 12.9 G/DL (ref 12–16)
LDLC SERPL CALC-MCNC: 153 MG/DL (ref 0–100)
LIPID PROFILE,FLP: ABNORMAL
LYMPHOCYTES # BLD AUTO: 43 % (ref 21–52)
LYMPHOCYTES # BLD: 1.9 K/UL (ref 0.9–3.6)
MCH RBC QN AUTO: 30.2 PG (ref 24–34)
MCHC RBC AUTO-ENTMCNC: 32.6 G/DL (ref 31–37)
MCV RBC AUTO: 92.7 FL (ref 74–97)
MONOCYTES # BLD: 0.3 K/UL (ref 0.05–1.2)
MONOCYTES NFR BLD AUTO: 6 % (ref 3–10)
NEUTS SEG # BLD: 2.1 K/UL (ref 1.8–8)
NEUTS SEG NFR BLD AUTO: 48 % (ref 40–73)
PLATELET # BLD AUTO: 280 K/UL (ref 135–420)
PMV BLD AUTO: 10.5 FL (ref 9.2–11.8)
POTASSIUM SERPL-SCNC: 3.9 MMOL/L (ref 3.5–5.5)
PROT SERPL-MCNC: 7.2 G/DL (ref 6.4–8.2)
RBC # BLD AUTO: 4.27 M/UL (ref 4.2–5.3)
SODIUM SERPL-SCNC: 140 MMOL/L (ref 136–145)
TRIGL SERPL-MCNC: 65 MG/DL (ref ?–150)
VLDLC SERPL CALC-MCNC: 13 MG/DL
WBC # BLD AUTO: 4.4 K/UL (ref 4.6–13.2)

## 2017-06-07 PROCEDURE — 85025 COMPLETE CBC W/AUTO DIFF WBC: CPT | Performed by: NURSE PRACTITIONER

## 2017-06-07 PROCEDURE — 80061 LIPID PANEL: CPT | Performed by: NURSE PRACTITIONER

## 2017-06-07 PROCEDURE — 80053 COMPREHEN METABOLIC PANEL: CPT | Performed by: NURSE PRACTITIONER

## 2017-06-07 PROCEDURE — 36415 COLL VENOUS BLD VENIPUNCTURE: CPT | Performed by: NURSE PRACTITIONER

## 2017-06-08 ENCOUNTER — TELEPHONE (OUTPATIENT)
Dept: FAMILY MEDICINE CLINIC | Facility: CLINIC | Age: 57
End: 2017-06-08

## 2017-06-08 LAB
BACTERIA SPEC CULT: ABNORMAL
SERVICE CMNT-IMP: ABNORMAL

## 2017-06-08 NOTE — TELEPHONE ENCOUNTER
dont' see where bactrim was prescribed, she was given Rocephin in office, if she is having a reaction she should go to the ED since she is allergic to benadryl

## 2017-06-09 NOTE — TELEPHONE ENCOUNTER
Called pt and left message. Call back number left and I myself or one of the other nurses will attempt to contact again. The call was to inform pt  that the bacteria found in the urine culture, was treated in office with an injection of Rocephin 1 Gram. Keflex will not be prescribed as she was already treated. As this is apparently to only class of drugs that she can tolerate.  If her s/s remain she will need to schedule follow up with her PCP per NP [facility].

## 2017-06-12 NOTE — TELEPHONE ENCOUNTER
Spoke with pt in regards to lab results. Two pt identifier's and permission to release verified. Relayed NP.'s notes. Pt acknowledges understanding and voices no concerns at this time.

## 2017-06-14 ENCOUNTER — HOSPITAL ENCOUNTER (OUTPATIENT)
Dept: LAB | Age: 57
Discharge: HOME | End: 2017-06-14
Payer: SUBSIDIZED

## 2017-06-14 DIAGNOSIS — I10 MALIGNANT HYPERTENSION: ICD-10-CM

## 2017-06-14 DIAGNOSIS — R10.9 ACUTE LEFT FLANK PAIN: ICD-10-CM

## 2017-06-14 LAB
CREAT UR-MCNC: 36.6 MG/DL (ref 30–125)
MICROALBUMIN UR-MCNC: 0.2 MG/DL (ref 0–3)
MICROALBUMIN/CREAT UR-RTO: 5 MG/G (ref 0–30)

## 2017-06-14 PROCEDURE — 82043 UR ALBUMIN QUANTITATIVE: CPT | Performed by: NURSE PRACTITIONER

## 2017-06-20 ENCOUNTER — HOSPITAL ENCOUNTER (OUTPATIENT)
Dept: NON INVASIVE DIAGNOSTICS | Age: 57
Discharge: HOME | End: 2017-06-20
Attending: NURSE PRACTITIONER
Payer: SUBSIDIZED

## 2017-06-20 ENCOUNTER — HOSPITAL ENCOUNTER (OUTPATIENT)
Dept: GENERAL RADIOLOGY | Age: 57
Discharge: HOME | End: 2017-06-20
Attending: NURSE PRACTITIONER
Payer: SUBSIDIZED

## 2017-06-20 DIAGNOSIS — R01.1 SYSTOLIC MURMUR: ICD-10-CM

## 2017-06-20 DIAGNOSIS — Z87.442 H/O RENAL CALCULI: ICD-10-CM

## 2017-06-20 DIAGNOSIS — R10.9 ACUTE LEFT FLANK PAIN: ICD-10-CM

## 2017-06-20 PROCEDURE — 93306 TTE W/DOPPLER COMPLETE: CPT

## 2017-06-20 PROCEDURE — 74000 XR ABD (KUB): CPT

## 2017-06-26 DIAGNOSIS — I10 ESSENTIAL HYPERTENSION: ICD-10-CM

## 2017-06-26 RX ORDER — HYDROCHLOROTHIAZIDE 12.5 MG/1
TABLET ORAL
Qty: 30 TAB | Refills: 1 | Status: SHIPPED | OUTPATIENT
Start: 2017-06-26 | End: 2017-07-05 | Stop reason: SDUPTHER

## 2017-07-05 ENCOUNTER — OFFICE VISIT (OUTPATIENT)
Dept: FAMILY MEDICINE CLINIC | Facility: CLINIC | Age: 57
End: 2017-07-05

## 2017-07-05 ENCOUNTER — TELEPHONE (OUTPATIENT)
Dept: FAMILY MEDICINE CLINIC | Facility: CLINIC | Age: 57
End: 2017-07-05

## 2017-07-05 VITALS
HEART RATE: 73 BPM | DIASTOLIC BLOOD PRESSURE: 100 MMHG | TEMPERATURE: 96.4 F | WEIGHT: 107.4 LBS | SYSTOLIC BLOOD PRESSURE: 180 MMHG | OXYGEN SATURATION: 100 % | RESPIRATION RATE: 15 BRPM | BODY MASS INDEX: 21.65 KG/M2 | HEIGHT: 59 IN

## 2017-07-05 DIAGNOSIS — E78.00 PURE HYPERCHOLESTEROLEMIA: ICD-10-CM

## 2017-07-05 DIAGNOSIS — I47.1 SVT (SUPRAVENTRICULAR TACHYCARDIA) (HCC): ICD-10-CM

## 2017-07-05 DIAGNOSIS — F41.9 ANXIETY: ICD-10-CM

## 2017-07-05 DIAGNOSIS — D70.8 OTHER NEUTROPENIA (HCC): ICD-10-CM

## 2017-07-05 DIAGNOSIS — I10 ESSENTIAL HYPERTENSION: Primary | ICD-10-CM

## 2017-07-05 DIAGNOSIS — R31.9 HEMATURIA: ICD-10-CM

## 2017-07-05 PROBLEM — D70.9 NEUTROPENIA (HCC): Status: ACTIVE | Noted: 2017-07-05

## 2017-07-05 LAB
BILIRUB UR QL STRIP: NEGATIVE
GLUCOSE UR-MCNC: NEGATIVE MG/DL
KETONES P FAST UR STRIP-MCNC: NEGATIVE MG/DL
PH UR STRIP: 6 [PH] (ref 4.6–8)
PROT UR QL STRIP: NEGATIVE MG/DL
SP GR UR STRIP: 1.01 (ref 1–1.03)
UA UROBILINOGEN AMB POC: NORMAL (ref 0.2–1)
URINALYSIS CLARITY POC: CLEAR
URINALYSIS COLOR POC: YELLOW
URINE BLOOD POC: NEGATIVE
URINE LEUKOCYTES POC: NEGATIVE
URINE NITRITES POC: NEGATIVE

## 2017-07-05 RX ORDER — HYDROCHLOROTHIAZIDE 12.5 MG/1
TABLET ORAL
Qty: 90 TAB | Refills: 1 | Status: SHIPPED | OUTPATIENT
Start: 2017-07-05 | End: 2017-11-30

## 2017-07-05 RX ORDER — METOPROLOL SUCCINATE 25 MG/1
12.5 TABLET, EXTENDED RELEASE ORAL 2 TIMES DAILY
Qty: 90 TAB | Refills: 1 | Status: SHIPPED | OUTPATIENT
Start: 2017-07-05 | End: 2017-12-18 | Stop reason: SDUPTHER

## 2017-07-05 NOTE — TELEPHONE ENCOUNTER
Please advise her urine did not show blood in it or anything else abnormal, will continue to monitor this

## 2017-07-05 NOTE — PATIENT INSTRUCTIONS

## 2017-07-05 NOTE — PROGRESS NOTES
Krystina Denny is a 59 y.o.  female presents today for office visit for follow up. Pt is in Room # 9      1. Have you been to the ER, urgent care clinic since your last visit? Hospitalized since your last visit? No    2. Have you seen or consulted any other health care providers outside of the [address] [place] [address] since your last visit? Include any pap smears or colon screening. No    No Patient Care Coordination Note on file.

## 2017-07-05 NOTE — LETTER
7/10/2017 9:47 AM 
 
Ms. [address] 
[address] [facility] [address] [phone] Dear Ms. Garza, We attempted to reach you by phone but were unsuccessful. Your recent urine testing did not show any blood or anything else concerning, we will continue to monitor this at this time.    
 
 
 
 
Sincerely, 
 
 
 
 
ARUNA Martinez

## 2017-07-05 NOTE — PROGRESS NOTES
History and Physical    Patient: Brooke Abdi MRN: [id number]  SSN: [id number]    YOB: 1960  Age: 59 y.o. Sex: female      Subjective:      Brooke Abdi is a 59 y.o. female who presents today for follow up HTN, anxiety, h/o SVT etc.    In terms of her HTN, she currently takes half tab Toprol 25 mg BID and HCTZ. BP elevated today, however, she states that she gets white coat HTN. States has been checking her BP at home with readings averaging around 140s SBP. Patient has a h/o anxiety, was given Prozac and xanax, however, she never started these. She states that she is well controlled, not wanting therapy or medication. In terms of her h/o SVT, she has been referred to cardiology, however, she did not want to incur any more bills at this point. She is in process of trying to apply for care card. She denies palpitations or chest pains. Patient checks her urine at home as has at home urinalysis sticks, has noticed microscopic hematuria, patient has a remote h/o kidney stones. Was recently treated for UTI/pyelonephritis several weeks ago.  Denies flank pain, back pain, dysuria, gross hematuria etc.     Last Colonoscopy:  has not had done yet-fit ordered-not done  Last DEXA: never done; will await patient to have health insurance first  Last Mammogram: 2011- was normal, no h/o abnormals-was scheduled but cancelled-patient saving up for 3D mammogram  Last PAP: 3/2016-normal; no h/o abnormals      PMH:  Past Medical History:   Diagnosis Date    FHx: SVT (supraventricular tachycardia) 05/2016    Fibroid uterus 2011    H/O cyst of breast 1987    Hypertension     TIA (transient ischemic attack) 2014     Past Surgical History:   Procedure Laterality Date    HX BREAST BIOPSY Right 1987    RIGHT HEART CATHETERIZATION      2011        FamHx:  Family History   Problem Relation Age of Onset    Hypertension Other     Cancer Mother 36     Cervical    Stroke Father     Hypertension Father     No Known Problems Sister     Heart Attack Paternal Aunt     No Known Problems Sister        SocialHx:  Social History   Substance Use Topics    Smoking status: Never Smoker    Smokeless tobacco: Never Used    Alcohol use No        Meds:  Prior to Admission medications    Medication Sig Start Date End Date Taking? Authorizing Provider   omega 3-dha-epa-fish oil (FISH OIL) 100-160-1,000 mg cap Take  by mouth. Yes Historical Provider   hydroCHLOROthiazide (HYDRODIURIL) 12.5 mg tablet TAKE 1 TABLET BY MOUTH EVERY DAY 7/5/17  Yes Jessica Pumphrey V, PA   metoprolol succinate (TOPROL-XL) 25 mg XL tablet Take 0.5 Tabs by mouth two (2) times a day. 7/5/17  Yes Jessica Pumphrey V, PA   vitamin e (E GEMS) 1,000 unit capsule Take 1,000 Units by mouth daily. Yes Historical Provider   b complex vitamins tablet Take 1 Tab by mouth daily. Yes Historical Provider   MULTIVIT WITH CALCIUM,IRON,MIN [facility] MULTIPLE VITAMINS PO) Take 1 Tab by mouth daily. Yes Historical Provider   aspirin 81 mg chewable tablet Take 81 mg by mouth daily. Yes Historical Provider        Allergies: Allergies   Allergen Reactions    Aldomet [Methyldopa] Rash    Benadryl [Diphenhydramine Hcl] Other (comments)     incontinence    Cozaar [Losartan] Other (comments)     Neurological issues    Dyazide [Triamterene-Hydrochlorothiazid] Other (comments)     Neurological issues    Levaquin [Levofloxacin] Hives    Norvasc [Amlodipine] Rash and Other (comments)     Neurological issues    Penicillins Rash     Ok to take 2nd generation.     Sulfa (Sulfonamide Antibiotics) Rash    Verapamil Other (comments)     Neurological issues       Review of Systems:  Items in Bold are positive  Constitutional: negative for fevers, chills and malaise  Eyes: negative for visual disturbance  Ears, Nose, Mouth, Throat, and Face: negative for nasal congestion  Respiratory: negative for cough or SOB  Cardiovascular: negative for chest pain, chest pressure/discomfort  Gastrointestinal: negative for nausea, vomiting, melena, diarrhea, constipation and abdominal pain  Genitourinary: microscopic hematuria, negative for frequency, dysuria or hematuria  Musculoskeletal:negative for myalgias and arthralgias  Neurological: negative for headaches, dizziness and paresthesia    Objective:     Visit Vitals    BP (!) 180/100 (BP 1 Location: Left arm, BP Patient Position: Sitting)    Pulse 73    Temp 96.4 °F (35.8 °C) (Oral)    Resp 15    Ht 4' 11\" (1.499 m)    Wt 107 lb 6.4 oz (48.7 kg)    SpO2 100%    BMI 21.69 kg/m2       Physical Exam:  GENERAL: alert, cooperative, no distress, appears stated age  HEENT: EYE: conjunctivae/corneas clear. EOM's intact. EAR: TM's pearly gray bilaterally NOSE: Nasal mucosa pink and moist bilaterally, THROAT: no erythema or edema  THYROID: no thyromegaly  NECK: no adenopathy  LUNG: clear to auscultation bilaterally  HEART: regular rate and rhythm, S1, S2 normal, no murmur, click, rub or gallop  ABDOMEN: soft, non-tender. Bowel sounds normal. No masses  EXTREMITIES:  extremities normal, atraumatic, no cyanosis or edema  NEUROLOGIC: AOx3.  Gait normal.    Labs  Lab Results   Component Value Date/Time    WBC 4.4 06/07/2017 08:27 AM    Hemoglobin, POC 12.6 09/22/2012 10:52 AM    HGB 12.9 06/07/2017 08:27 AM    Hematocrit, POC 37 09/22/2012 10:52 AM    HCT 39.6 06/07/2017 08:27 AM    PLATELET 565 08/48/9204 08:27 AM    MCV 92.7 06/07/2017 08:27 AM     Lab Results   Component Value Date/Time    Sodium 140 06/07/2017 08:27 AM    Potassium 3.9 06/07/2017 08:27 AM    Chloride 103 06/07/2017 08:27 AM    CO2 26 06/07/2017 08:27 AM    Anion gap 11 06/07/2017 08:27 AM    Glucose 89 06/07/2017 08:27 AM    BUN 15 06/07/2017 08:27 AM    Creatinine 0.91 06/07/2017 08:27 AM    BUN/Creatinine ratio 16 06/07/2017 08:27 AM    GFR est AA >60 06/07/2017 08:27 AM    GFR est non-AA >60 06/07/2017 08:27 AM    Calcium 9.5 06/07/2017 08:27 AM Bilirubin, total 0.4 06/07/2017 08:27 AM    AST (SGOT) 21 06/07/2017 08:27 AM    Alk. phosphatase 82 06/07/2017 08:27 AM    Protein, total 7.2 06/07/2017 08:27 AM    Albumin 4.1 06/07/2017 08:27 AM    Globulin 3.1 06/07/2017 08:27 AM    A-G Ratio 1.3 06/07/2017 08:27 AM    ALT (SGPT) 20 06/07/2017 08:27 AM     Lab Results   Component Value Date/Time    Cholesterol, total 253 06/07/2017 08:27 AM    HDL Cholesterol 87 06/07/2017 08:27 AM    LDL, calculated 153 06/07/2017 08:27 AM    VLDL, calculated 13 06/07/2017 08:27 AM    Triglyceride 65 06/07/2017 08:27 AM    CHOL/HDL Ratio 2.9 06/07/2017 08:27 AM     Results for orders placed or performed in visit on 07/05/17   AMB POC URINALYSIS DIP STICK AUTO W/ MICRO     Status: None   Result Value Ref Range Status    Color (UA POC) Yellow  Final    Clarity (UA POC) Clear  Final    Glucose (UA POC) Negative Negative Final    Bilirubin (UA POC) Negative Negative Final    Ketones (UA POC) Negative Negative Final    Specific gravity (UA POC) 1.015 1.001 - 1.035 Final    Blood (UA POC) Negative Negative Final    pH (UA POC) 6.0 4.6 - 8.0 Final    Protein (UA POC) Negative Negative mg/dL Final    Urobilinogen (UA POC) 0.2 mg/dL 0.2 - 1 Final    Nitrites (UA POC) Negative Negative Final    Leukocyte esterase (UA POC) Negative Negative Final                   Assessment and Plan:       ICD-10-CM ICD-9-CM    1. Essential hypertension I10 401.9 hydroCHLOROthiazide (HYDRODIURIL) 12.5 mg tablet      metoprolol succinate (TOPROL-XL) 25 mg XL tablet   2. Pure hypercholesterolemia E78.00 272.0    3. Other neutropenia ([address] D70.8 288.09    4. SVT (supraventricular tachycardia) (HCC) I47.1 427.89 metoprolol succinate (TOPROL-XL) 25 mg XL tablet   5. Hematuria R31.9 599.70 AMB POC URINALYSIS DIP STICK AUTO W/ MICRO   6.  Anxiety F41.9 300.00          Medical Decision Making:  HTN- continue current therapy- patient to continue to monitor her BP at home, call if trending higher at home    HLD- patient declines therapy- wishes to start mediterranean diet first    Neutropenia-  Continue to monitor    H/O SVT- continue to monitor- patient declines seeing cardiology at this point    Hematuria- continue to monitor    Anxiety- continue to monitor- well controlled outside of medical providers office        Follow-up Disposition:  Return in about 3 months (around 10/5/2017) for routine care with me. Patient acknowledges understanding of instructions and acknowledges understanding to call back if current symptoms worsen or new symptoms arise. Patient acknowledges and agrees with plan.         Signed By: ARUNA Doshi     July 5, 2017

## 2017-07-05 NOTE — MR AVS SNAPSHOT
Visit Information Date & Time Provider Department Dept. Phone Encounter #  
 7/5/2017  9:30 AM KADY Rashid [facility] [phone] [id number] Follow-up Instructions Return in about 3 months (around 10/5/2017) for routine care with me. Upcoming Health Maintenance Date Due  
 BREAST CANCER SCRN MAMMOGRAM 3/8/2008 FOBT Q 1 YEAR AGE 50-75 3/8/2008 INFLUENZA AGE 9 TO ADULT 8/1/2017 PAP AKA CERVICAL CYTOLOGY 3/1/2019 DTaP/Tdap/Td series (2 - Td) 1/1/2026 Allergies as of 7/5/2017  Review Complete On: 7/5/2017 By: Sandrita Schneider LPN Severity Noted Reaction Type Reactions Aldomet [Methyldopa]  02/10/2017    Rash Benadryl [Diphenhydramine Hcl]  02/10/2017    Other (comments)  
 incontinence Cozaar [Losartan]  02/10/2017   Intolerance Other (comments) Neurological issues Dyazide [Triamterene-hydrochlorothiazid]  02/10/2017   Intolerance Other (comments) Neurological issues Levaquin [Levofloxacin]  02/10/2017   Intolerance Hives Norvasc [Amlodipine]  02/10/2017   Intolerance Rash, Other (comments) Neurological issues Penicillins  02/10/2017    Rash Ok to take 2nd generation. Sulfa (Sulfonamide Antibiotics)  02/10/2017   Side Effect Rash Verapamil  02/10/2017   Intolerance Other (comments) Neurological issues Current Immunizations  Never Reviewed No immunizations on file. Not reviewed this visit You Were Diagnosed With   
  
 Codes Comments Essential hypertension    -  Primary ICD-10-CM: I10 
ICD-9-CM: 401.9 Pure hypercholesterolemia     ICD-10-CM: E78.00 ICD-9-CM: 272.0 Other neutropenia ([address]     ICD-10-CM: D70.8 ICD-9-CM: 288.09   
 SVT (supraventricular tachycardia) (HCC)     ICD-10-CM: I47.1 ICD-9-CM: 427.89 Hematuria     ICD-10-CM: R31.9 ICD-9-CM: 599.70 Vitals BP Pulse Temp Resp Height(growth percentile) Weight(growth percentile) Zee Noel ) 199/98 (BP 1 Location: Left arm, BP Patient Position: Sitting) 73 96.4 °F (35.8 °C) (Oral) 15 4' 11\" (1.499 m) 107 lb 6.4 oz (48.7 kg) SpO2 BMI OB Status Smoking Status 100% 21.69 kg/m2 Menopause Never Smoker BMI and BSA Data Body Mass Index Body Surface Area  
 21.69 kg/m 2 1.42 m 2 Preferred Pharmacy Pharmacy Name Phone [id number] [place], [address] [phone] Your Updated Medication List  
  
   
This list is accurate as of: 7/5/17 10:00 AM.  Always use your most recent med list.  
  
  
  
  
 aspirin 81 mg chewable tablet Take 81 mg by mouth daily. b complex vitamins tablet Take 1 Tab by mouth daily. FISH -160-1,000 mg Cap Generic drug:  omega 3-dha-epa-fish oil Take  by mouth. hydroCHLOROthiazide 12.5 mg tablet Commonly known as:  HYDRODIURIL  
TAKE 1 TABLET BY MOUTH EVERY DAY  
  
 metoprolol succinate 25 mg XL tablet Commonly known as:  TOPROL-XL Take 0.5 Tabs by mouth two (2) times a day. vitamin e 1,000 unit capsule Commonly known as:  E GEMS Take 1,000 Units by mouth daily. WOMEN'S MULTIPLE VITAMINS PO Take 1 Tab by mouth daily. Prescriptions Sent to Pharmacy Refills  
 hydroCHLOROthiazide (HYDRODIURIL) 12.5 mg tablet 1 Sig: TAKE 1 TABLET BY MOUTH EVERY DAY Class: Normal  
 Pharmacy: [facility] Ph #: [phone]  
 metoprolol succinate (TOPROL-XL) 25 mg XL tablet 1 Sig: Take 0.5 Tabs by mouth two (2) times a day. Class: Normal  
 Pharmacy: [address], [address] Ph #: [phone] Route: Oral  
  
We Performed the Following AMB POC URINALYSIS DIP STICK AUTO W/ MICRO [71993 CPT(R)] Follow-up Instructions Return in about 3 months (around 10/5/2017) for routine care with me. Patient Instructions DASH Diet: Care Instructions Your Care Instructions The DASH diet is an eating plan that can help lower your blood pressure. DASH stands for Dietary Approaches to Stop Hypertension. Hypertension is high blood pressure. The DASH diet focuses on eating foods that are high in calcium, potassium, and magnesium. These nutrients can lower blood pressure. The foods that are highest in these nutrients are fruits, vegetables, low-fat dairy products, nuts, seeds, and legumes. But taking calcium, potassium, and magnesium supplements instead of eating foods that are high in those nutrients does not have the same effect. The DASH diet also includes whole grains, fish, and poultry. The DASH diet is one of several lifestyle changes your doctor may recommend to lower your high blood pressure. Your doctor may also want you to decrease the amount of sodium in your diet. Lowering sodium while following the DASH diet can lower blood pressure even further than just the DASH diet alone. Follow-up care is a key part of your treatment and safety. Be sure to make and go to all appointments, and call your doctor if you are having problems. It's also a good idea to know your test results and keep a list of the medicines you take. How can you care for yourself at home? Following the DASH diet · Eat 4 to 5 servings of fruit each day. A serving is 1 medium-sized piece of fruit, ½ cup chopped or canned fruit, 1/4 cup dried fruit, or 4 ounces (½ cup) of fruit juice. Choose fruit more often than fruit juice. · Eat 4 to 5 servings of vegetables each day. A serving is 1 cup of lettuce or raw leafy vegetables, ½ cup of chopped or cooked vegetables, or 4 ounces (½ cup) of vegetable juice. Choose vegetables more often than vegetable juice. · Get 2 to 3 servings of low-fat and fat-free dairy each day. A serving is 8 ounces of milk, 1 cup of yogurt, or 1 ½ ounces of cheese. · Eat 6 to 8 servings of grains each day.  A serving is 1 slice of bread, 1 ounce of dry cereal, or ½ cup of cooked rice, pasta, or cooked cereal. Try to choose whole-grain products as much as possible. · Limit lean meat, poultry, and fish to 2 servings each day. A serving is 3 ounces, about the size of a deck of cards. · Eat 4 to 5 servings of nuts, seeds, and legumes (cooked dried beans, lentils, and split peas) each week. A serving is 1/3 cup of nuts, 2 tablespoons of seeds, or ½ cup of cooked beans or peas. · Limit fats and oils to 2 to 3 servings each day. A serving is 1 teaspoon of vegetable oil or 2 tablespoons of salad dressing. · Limit sweets and added sugars to 5 servings or less a week. A serving is 1 tablespoon jelly or jam, ½ cup sorbet, or 1 cup of lemonade. · Eat less than 2,300 milligrams (mg) of sodium a day. If you limit your sodium to 1,500 mg a day, you can lower your blood pressure even more. Tips for success · Start small. Do not try to make dramatic changes to your diet all at once. You might feel that you are missing out on your favorite foods and then be more likely to not follow the plan. Make small changes, and stick with them. Once those changes become habit, add a few more changes. · Try some of the following: ¨ Make it a goal to eat a fruit or vegetable at every meal and at snacks. This will make it easy to get the recommended amount of fruits and vegetables each day. ¨ Try yogurt topped with fruit and nuts for a snack or healthy dessert. ¨ Add lettuce, tomato, cucumber, and onion to sandwiches. ¨ Combine a ready-made pizza crust with low-fat mozzarella cheese and lots of vegetable toppings. Try using tomatoes, squash, spinach, broccoli, carrots, cauliflower, and onions. ¨ Have a variety of cut-up vegetables with a low-fat dip as an appetizer instead of chips and dip. ¨ Sprinkle sunflower seeds or chopped almonds over salads. Or try adding chopped walnuts or almonds to cooked vegetables. ¨ Try some vegetarian meals using beans and peas. Add garbanzo or kidney beans to salads. Make burritos and tacos with mashed mendoza beans or black beans. Where can you learn more? Go to http://jono-osito.info/. Enter O824 in the search box to learn more about \"DASH Diet: Care Instructions. \" Current as of: April 3, 2017 Content Version: 11.3 © 1334-6581 [employer]. Care instructions adapted under license by [employer] (which disclaims liability or warranty for this information). If you have questions about a medical condition or this instruction, always ask your healthcare professional. [address] any warranty or liability for your use of this information. Introducing [facility]! Dear Jo Ann Mobley: 
Thank you for requesting a [employer] travel account. Our records indicate that you already have an active [employer] travel account. You can access your account anytime at [email or web address] Did you know that you can access your hospital and ER discharge instructions at any time in The [employer] travel? You can also review all of your test results from your hospital stay or ER visit. Additional Information If you have questions, please visit the Frequently Asked Questions section of the [employer] travel website at [email or web address] Remember, The [employer] travel is NOT to be used for urgent needs. For medical emergencies, dial 911. Now available from your iPhone and Android! Please provide this summary of care documentation to your next provider. Your primary care clinician is listed as NONE. If you have any questions after today's visit, please call [phone].

## 2017-11-30 ENCOUNTER — OFFICE VISIT (OUTPATIENT)
Dept: FAMILY MEDICINE CLINIC | Facility: CLINIC | Age: 57
End: 2017-11-30

## 2017-11-30 VITALS
RESPIRATION RATE: 16 BRPM | TEMPERATURE: 96 F | HEART RATE: 64 BPM | WEIGHT: 104.4 LBS | OXYGEN SATURATION: 100 % | BODY MASS INDEX: 21.05 KG/M2 | HEIGHT: 59 IN | SYSTOLIC BLOOD PRESSURE: 200 MMHG | DIASTOLIC BLOOD PRESSURE: 110 MMHG

## 2017-11-30 DIAGNOSIS — I47.1 SVT (SUPRAVENTRICULAR TACHYCARDIA) (HCC): ICD-10-CM

## 2017-11-30 DIAGNOSIS — E78.00 PURE HYPERCHOLESTEROLEMIA: ICD-10-CM

## 2017-11-30 DIAGNOSIS — I10 ESSENTIAL HYPERTENSION: Primary | ICD-10-CM

## 2017-11-30 DIAGNOSIS — Z12.11 SPECIAL SCREENING FOR MALIGNANT NEOPLASMS, COLON: ICD-10-CM

## 2017-11-30 DIAGNOSIS — F41.9 ANXIETY: ICD-10-CM

## 2017-11-30 DIAGNOSIS — D70.8 OTHER NEUTROPENIA (HCC): ICD-10-CM

## 2017-11-30 RX ORDER — HYDROCHLOROTHIAZIDE 25 MG/1
25 TABLET ORAL DAILY
Qty: 90 TAB | Refills: 0 | Status: SHIPPED | OUTPATIENT
Start: 2017-11-30 | End: 2018-08-27 | Stop reason: ALTCHOICE

## 2017-11-30 RX ORDER — HYDROCHLOROTHIAZIDE 12.5 MG/1
TABLET ORAL
Qty: 90 TAB | Refills: 1 | Status: CANCELLED | OUTPATIENT
Start: 2017-11-30

## 2017-11-30 NOTE — PROGRESS NOTES
Tone Craig is a 59 y.o.  female presents today for office visit for same day sick. Pt would also like to discuss blood pressure. Pt is not fasting. Pt is in Room # 8      1. Have you been to the ER, urgent care clinic since your last visit? Hospitalized since your last visit? No    2. Have you seen or consulted any other health care providers outside of the [address] [place] [address] since your last visit? Include any pap smears or colon screening.  No    Upcoming Appts  none    Health Maintenance reviewed       VORB:   Orders Placed This Encounter    LIPID PANEL    METABOLIC PANEL, COMPREHENSIVE    CBC WITH AUTOMATED DIFF    URINALYSIS W/ RFLX MICROSCOPIC   Niru Guido LPN

## 2017-11-30 NOTE — MR AVS SNAPSHOT
Visit Information Date & Time Provider Department Dept. Phone Encounter #  
 11/30/2017 10:00 AM Barbara Miller [facility] [phone] [id number] Follow-up Instructions Return in about 4 weeks (around 12/28/2017). Upcoming Health Maintenance Date Due Pneumococcal 19-64 Highest Risk (1 of 3 - PCV13) 3/8/1977 BREAST CANCER SCRN MAMMOGRAM 3/8/2008 FOBT Q 1 YEAR AGE 50-75 3/8/2008 Influenza Age 5 to Adult 8/1/2017 PAP AKA CERVICAL CYTOLOGY 3/1/2019 DTaP/Tdap/Td series (2 - Td) 1/1/2026 Allergies as of 11/30/2017  Review Complete On: 11/30/2017 By: Joseph Horowitz LPN Severity Noted Reaction Type Reactions Aldomet [Methyldopa]  02/10/2017    Rash Benadryl [Diphenhydramine Hcl]  02/10/2017    Other (comments)  
 incontinence Cozaar [Losartan]  02/10/2017   Intolerance Other (comments) Neurological issues Dyazide [Triamterene-hydrochlorothiazid]  02/10/2017   Intolerance Other (comments) Neurological issues Levaquin [Levofloxacin]  02/10/2017   Intolerance Hives Norvasc [Amlodipine]  02/10/2017   Intolerance Rash, Other (comments) Neurological issues Penicillins  02/10/2017    Rash Ok to take 2nd generation. Sulfa (Sulfonamide Antibiotics)  02/10/2017   Side Effect Rash Verapamil  02/10/2017   Intolerance Other (comments) Neurological issues Current Immunizations  Never Reviewed No immunizations on file. Not reviewed this visit You Were Diagnosed With   
  
 Codes Comments Essential hypertension    -  Primary ICD-10-CM: I10 
ICD-9-CM: 401.9 Pure hypercholesterolemia     ICD-10-CM: E78.00 ICD-9-CM: 272.0 Microscopic hematuria     ICD-10-CM: R31.29 ICD-9-CM: 599.72 Other neutropenia ([facility] [address]     ICD-10-CM: D70.8 ICD-9-CM: 288.09   
 SVT (supraventricular tachycardia) (HCC)     ICD-10-CM: I47.1 ICD-9-CM: 427.89   
 History of TIA (transient ischemic attack)     ICD-10-CM: Z86.73 
ICD-9-CM: V12.54 Special screening for malignant neoplasms, colon     ICD-10-CM: Z12.11 ICD-9-CM: V76.51 Vitals BP Pulse Temp Resp Height(growth percentile) Weight(growth percentile) (!) 200/110 (BP 1 Location: Left arm, BP Patient Position: Sitting) 64 96 °F (35.6 °C) (Oral) 16 4' 11\" (1.499 m) 104 lb 6.4 oz (47.4 kg) SpO2 BMI OB Status Smoking Status 100% 21.09 kg/m2 Menopause Never Smoker Vitals History BMI and BSA Data Body Mass Index Body Surface Area 21.09 kg/m 2 1.4 m 2 Preferred Pharmacy Pharmacy Name Phone [id number]- [address], [address] [phone] Your Updated Medication List  
  
   
This list is accurate as of: 11/30/17 10:28 AM.  Always use your most recent med list.  
  
  
  
  
 aspirin 81 mg chewable tablet Take 81 mg by mouth daily. b complex vitamins tablet Take 1 Tab by mouth daily. FISH -160-1,000 mg Cap Generic drug:  omega 3-dha-epa-fish oil Take  by mouth. * hydroCHLOROthiazide 12.5 mg tablet Commonly known as:  HYDRODIURIL  
TAKE 1 TABLET BY MOUTH EVERY DAY  
  
 * hydroCHLOROthiazide 25 mg tablet Commonly known as:  HYDRODIURIL Take 1 Tab by mouth daily. metoprolol succinate 25 mg XL tablet Commonly known as:  TOPROL-XL Take 0.5 Tabs by mouth two (2) times a day. vitamin e 1,000 unit capsule Commonly known as:  E GEMS Take 1,000 Units by mouth daily. WOMEN'S MULTIPLE VITAMINS PO Take 1 Tab by mouth daily. * Notice: This list has 2 medication(s) that are the same as other medications prescribed for you. Read the directions carefully, and ask your doctor or other care provider to review them with you. Prescriptions Sent to Pharmacy Refills  
 hydroCHLOROthiazide (HYDRODIURIL) 25 mg tablet 0 Sig: Take 1 Tab by mouth daily. Class: Normal  
 Pharm[address]: [address]  #: [phone] Route: Oral  
  
Follow-up Instructions Return in about 4 weeks (around 12/28/2017). To-Do List   
 12/14/2017 Lab:  CBC WITH AUTOMATED DIFF   
  
 12/14/2017 Lab:  LIPID PANEL   
  
 12/14/2017 Lab:  METABOLIC PANEL, COMPREHENSIVE   
  
 12/30/2017 Lab:  OCCULT BLOOD, IMMUNOASSAY (FIT) Patient Instructions DASH Diet: Care Instructions Your Care Instructions The DASH diet is an eating plan that can help lower your blood pressure. DASH stands for Dietary Approaches to Stop Hypertension. Hypertension is high blood pressure. The DASH diet focuses on eating foods that are high in calcium, potassium, and magnesium. These nutrients can lower blood pressure. The foods that are highest in these nutrients are fruits, vegetables, low-fat dairy products, nuts, seeds, and legumes. But taking calcium, potassium, and magnesium supplements instead of eating foods that are high in those nutrients does not have the same effect. The DASH diet also includes whole grains, fish, and poultry. The DASH diet is one of several lifestyle changes your doctor may recommend to lower your high blood pressure. Your doctor may also want you to decrease the amount of sodium in your diet. Lowering sodium while following the DASH diet can lower blood pressure even further than just the DASH diet alone. Follow-up care is a key part of your treatment and safety. Be sure to make and go to all appointments, and call your doctor if you are having problems. It's also a good idea to know your test results and keep a list of the medicines you take. How can you care for yourself at home? Following the DASH diet · Eat 4 to 5 servings of fruit each day.  A serving is 1 medium-sized piece of fruit, ½ cup chopped or canned fruit, 1/4 cup dried fruit, or 4 ounces (½ cup) of fruit juice. Choose fruit more often than fruit juice. · Eat 4 to 5 servings of vegetables each day. A serving is 1 cup of lettuce or raw leafy vegetables, ½ cup of chopped or cooked vegetables, or 4 ounces (½ cup) of vegetable juice. Choose vegetables more often than vegetable juice. · Get 2 to 3 servings of low-fat and fat-free dairy each day. A serving is 8 ounces of milk, 1 cup of yogurt, or 1 ½ ounces of cheese. · Eat 6 to 8 servings of grains each day. A serving is 1 slice of bread, 1 ounce of dry cereal, or ½ cup of cooked rice, pasta, or cooked cereal. Try to choose whole-grain products as much as possible. · Limit lean meat, poultry, and fish to 2 servings each day. A serving is 3 ounces, about the size of a deck of cards. · Eat 4 to 5 servings of nuts, seeds, and legumes (cooked dried beans, lentils, and split peas) each week. A serving is 1/3 cup of nuts, 2 tablespoons of seeds, or ½ cup of cooked beans or peas. · Limit fats and oils to 2 to 3 servings each day. A serving is 1 teaspoon of vegetable oil or 2 tablespoons of salad dressing. · Limit sweets and added sugars to 5 servings or less a week. A serving is 1 tablespoon jelly or jam, ½ cup sorbet, or 1 cup of lemonade. · Eat less than 2,300 milligrams (mg) of sodium a day. If you limit your sodium to 1,500 mg a day, you can lower your blood pressure even more. Tips for success · Start small. Do not try to make dramatic changes to your diet all at once. You might feel that you are missing out on your favorite foods and then be more likely to not follow the plan. Make small changes, and stick with them. Once those changes become habit, add a few more changes. · Try some of the following: ¨ Make it a goal to eat a fruit or vegetable at every meal and at snacks. This will make it easy to get the recommended amount of fruits and vegetables each day. ¨ Try yogurt topped with fruit and nuts for a snack or healthy dessert. ¨ Add lettuce, tomato, cucumber, and onion to sandwiches. ¨ Combine a ready-made pizza crust with low-fat mozzarella cheese and lots of vegetable toppings. Try using tomatoes, squash, spinach, broccoli, carrots, cauliflower, and onions. ¨ Have a variety of cut-up vegetables with a low-fat dip as an appetizer instead of chips and dip. ¨ Sprinkle sunflower seeds or chopped almonds over salads. Or try adding chopped walnuts or almonds to cooked vegetables. ¨ Try some vegetarian meals using beans and peas. Add garbanzo or kidney beans to salads. Make burritos and tacos with mashed mendoza beans or black beans. Where can you learn more? Go to http://jono-osito.info/. Enter Q984 in the search box to learn more about \"DASH Diet: Care Instructions. \" Current as of: September 21, 2016 Content Version: 11.4 © 3877-4515 [employer]. Care instructions adapted under license by [employer] (which disclaims liability or warranty for this information). If you have questions about a medical condition or this instruction, always ask your healthcare professional. [address] any warranty or liability for your use of this information. Introducing \[facility] [place] [facility]\"" & HEALTH SERVICES! Dear Tierra Centeno: 
Thank you for requesting a [employer] account. Our records indicate that you already have an active [employer] account. You can access your account anytime at [email or web address] Did you know that you can access your hospital and ER discharge instructions at any time in [employer]? You can also review all of your test results from your hospital stay or ER visit. Additional Information If you have questions, please visit the Frequently Asked Questions section of the [employer] website at [email or web address] Remember, [employer] is NOT to be used for urgent needs. For medical emergencies, dial 911. Now available from your iPhone and Android! Please provide this summary of care documentation to your next provider. Your primary care clinician is listed as NONE. If you have any questions after today's visit, please call [phone].

## 2017-11-30 NOTE — LETTER
NOTIFICATION RETURN TO WORK / SCHOOL 
 
11/30/2017 10:14 AM 
 
Ms. Sahil Ferroetti [address] 
[address] [facility] [address] [id number] To Whom It May Concern: 
 
Sahil Cosme is currently under the care of [address] [place] [address]. She will return to work/school on: 12/4/17 If there are questions or concerns please have the patient contact our office.  
 
 
 
Sincerely, 
 
 
 
 
 
ARUNA George

## 2017-11-30 NOTE — PATIENT INSTRUCTIONS

## 2017-11-30 NOTE — PROGRESS NOTES
History and Physical    Patient: Naomi Salinas MRN: [id number]  SSN: [id number]    YOB: 1960  Age: 59 y.o. Sex: female      Subjective:      Naomi Salinas is a 59 y.o. female who presents today for follow up HTN, HLD, h/o SVT etc.    In terms of her HTN, she currently takes half tab Toprol 25 mg BID and HCTZ. BP high today, however, she states that she gets white coat HTN and also under a lot of stress at work. She denies dizziness, confusion etc.  States has been checking her BP at home with readings averaging around 150-160 SBP. In terms of her h/o SVT, she has been referred to cardiology, however, she did not want to incur any more bills at this point. She is in process of trying to apply for care card. She denies palpitations or chest pains. Patient denies any recurrence of microscopic hematuria, continues to monitor with urinalysis sticks, declines urinalysis today. Patient has a h/o anxiety, is not on medication and not wanting to be on medication, has self coping mechanisms to include listening to music. Patient has a h/o HLD, was doing trial of lifestyle modifications.     Last Colonoscopy:  has not had done yet-fit reordered  Last DEXA: never done; will await patient to have health insurance first  Last Mammogram: 2011- was normal, no h/o abnormals-was scheduled but cancelled-patient saving up for 3D mammogram  Last PAP: 3/2016-normal; no h/o abnormals      PMH:  Past Medical History:   Diagnosis Date    FHx: SVT (supraventricular tachycardia) 05/2016    Fibroid uterus 2011    H/O cyst of breast 1987    Hypertension     TIA (transient ischemic attack) 2014     Past Surgical History:   Procedure Laterality Date    HX BREAST BIOPSY Right 1987    RIGHT HEART CATHETERIZATION      2011        FamHx:  Family History   Problem Relation Age of Onset    Hypertension Other     Cancer Mother 36     Cervical    Stroke Father     Hypertension Father     No Known Problems Sister     Heart Attack Paternal Aunt     No Known Problems Sister        SocialHx:  Social History   Substance Use Topics    Smoking status: Never Smoker    Smokeless tobacco: Never Used    Alcohol use No        Meds:  Prior to Admission medications    Medication Sig Start Date End Date Taking? Authorizing Provider   hydroCHLOROthiazide (HYDRODIURIL) 25 mg tablet Take 1 Tab by mouth daily. 11/30/17  Yes Jessica Pumphrey V, PA   omega 3-dha-epa-fish oil (FISH OIL) 100-160-1,000 mg cap Take  by mouth. Yes Historical Provider   hydroCHLOROthiazide (HYDRODIURIL) 12.5 mg tablet TAKE 1 TABLET BY MOUTH EVERY DAY 7/5/17  Yes Jessica Pumphrey V, PA   metoprolol succinate (TOPROL-XL) 25 mg XL tablet Take 0.5 Tabs by mouth two (2) times a day. 7/5/17  Yes Jessica Pumphrey V, PA   vitamin e (E GEMS) 1,000 unit capsule Take 1,000 Units by mouth daily. Yes Historical Provider   b complex vitamins tablet Take 1 Tab by mouth daily. Yes Historical Provider   MULTIVIT WITH CALCIUM,IRON,MIN [facility] MULTIPLE VITAMINS PO) Take 1 Tab by mouth daily. Yes Historical Provider   aspirin 81 mg chewable tablet Take 81 mg by mouth daily. Yes Historical Provider        Allergies: Allergies   Allergen Reactions    Aldomet [Methyldopa] Rash    Benadryl [Diphenhydramine Hcl] Other (comments)     incontinence    Cozaar [Losartan] Other (comments)     Neurological issues    Dyazide [Triamterene-Hydrochlorothiazid] Other (comments)     Neurological issues    Levaquin [Levofloxacin] Hives    Norvasc [Amlodipine] Rash and Other (comments)     Neurological issues    Penicillins Rash     Ok to take 2nd generation.     Sulfa (Sulfonamide Antibiotics) Rash    Verapamil Other (comments)     Neurological issues       Review of Systems:  Items in Bold are positive  Constitutional: negative for fevers, chills and malaise  Eyes: negative for visual disturbance  Ears, Nose, Mouth, Throat, and Face: negative for nasal congestion  Respiratory: negative for cough or SOB  Cardiovascular: negative for chest pain, chest pressure/discomfort  Gastrointestinal: negative for nausea, vomiting, melena, diarrhea, constipation and abdominal pain  Genitourinary: negative for frequency, dysuria or hematuria  Musculoskeletal:negative for myalgias and arthralgias  Neurological: negative for headaches, dizziness and paresthesia  Psych: anxiety, denies si/hi    Objective:     Visit Vitals    BP (!) 200/110 (BP 1 Location: Left arm, BP Patient Position: Sitting)    Pulse 64    Temp 96 °F (35.6 °C) (Oral)    Resp 16    Ht 4' 11\" (1.499 m)    Wt 104 lb 6.4 oz (47.4 kg)    SpO2 100%    BMI 21.09 kg/m2       Physical Exam:  GENERAL: alert, cooperative, no distress, appears stated age  HEENT: EYE: conjunctivae/corneas clear. EOM's intact. EAR: TM's pearly gray bilaterally NOSE: Nasal mucosa pink and moist bilaterally, THROAT: no erythema or edema  THYROID: no thyromegaly  NECK: no adenopathy  LUNG: clear to auscultation bilaterally  HEART: regular rate and rhythm, S1, S2 normal, no murmur, click, rub or gallop  ABDOMEN: soft, non-tender. Bowel sounds normal. No masses  EXTREMITIES:  extremities normal, atraumatic, no cyanosis or edema  NEUROLOGIC: AOx3. Gait normal.  PSYCH: mood: anxiety affect: anxious      Assessment and Plan:       ICD-10-CM ICD-9-CM    1. Essential hypertension B09 568.9 METABOLIC PANEL, COMPREHENSIVE      hydroCHLOROthiazide (HYDRODIURIL) 25 mg tablet   2. Anxiety F41.9 300.00    3. Pure hypercholesterolemia E78.00 272.0 LIPID PANEL   4. Other neutropenia (HCC) D70.8 288.09 CBC WITH AUTOMATED DIFF   5. SVT (supraventricular tachycardia) (HCC) I47.1 427.89    6.  Special screening for malignant neoplasms, colon Z12.11 V76.51 OCCULT BLOOD, IMMUNOASSAY (FIT)         Medical Decision Making:  HTN- increase HCTZ to 25 mg- likely will need more increase but patient is very sensitive to medications, will start slow, patient to RTC tomorrow for BP recheck with nurse-patient educated on importance of continuing to monitor BP and r/o extremely elevated BP    Anxiety- patient continues to decline medication at this time, is trying to self soothe    HLD- labs- needs follow up    Neutropenia-  Labs- needs follow up    H/O SVT- continue to monitor- patient declines seeing cardiology at this point        Follow-up Disposition:  Return in about 4 weeks (around 12/28/2017). Patient acknowledges understanding of instructions and acknowledges understanding to call back if current symptoms worsen or new symptoms arise. Patient acknowledges and agrees with plan.         Signed By: ARUNA Ovalle     November 30, 2017

## 2017-12-18 DIAGNOSIS — I10 ESSENTIAL HYPERTENSION: ICD-10-CM

## 2017-12-18 DIAGNOSIS — I47.1 SVT (SUPRAVENTRICULAR TACHYCARDIA) (HCC): ICD-10-CM

## 2017-12-18 RX ORDER — METOPROLOL SUCCINATE 25 MG/1
12.5 TABLET, EXTENDED RELEASE ORAL 2 TIMES DAILY
Qty: 90 TAB | Refills: 1 | Status: SHIPPED | OUTPATIENT
Start: 2017-12-18 | End: 2017-12-27 | Stop reason: SDUPTHER

## 2017-12-27 DIAGNOSIS — I10 ESSENTIAL HYPERTENSION: ICD-10-CM

## 2017-12-27 DIAGNOSIS — I47.1 SVT (SUPRAVENTRICULAR TACHYCARDIA) (HCC): ICD-10-CM

## 2017-12-27 RX ORDER — METOPROLOL SUCCINATE 25 MG/1
12.5 TABLET, EXTENDED RELEASE ORAL 2 TIMES DAILY
Qty: 90 TAB | Refills: 1 | Status: SHIPPED | OUTPATIENT
Start: 2017-12-27 | End: 2018-07-31

## 2018-06-26 NOTE — TELEPHONE ENCOUNTER
Received a refill fax for HCTZ from [facility]. Call placed to the pt and left a message. Call paced to the pharmacy and made aware pt has not been seen since 11/2017 and inquiring if the pt is continuing care with this office.

## 2018-06-27 DIAGNOSIS — I10 ESSENTIAL HYPERTENSION: ICD-10-CM

## 2018-06-27 RX ORDER — HYDROCHLOROTHIAZIDE 25 MG/1
25 TABLET ORAL DAILY
Qty: 90 TAB | Refills: 0 | OUTPATIENT
Start: 2018-06-27

## 2018-07-31 ENCOUNTER — OFFICE VISIT (OUTPATIENT)
Dept: FAMILY MEDICINE CLINIC | Age: 58
End: 2018-07-31

## 2018-07-31 ENCOUNTER — HOSPITAL ENCOUNTER (OUTPATIENT)
Dept: LAB | Age: 58
Discharge: HOME | End: 2018-07-31
Payer: COMMERCIAL

## 2018-07-31 VITALS
OXYGEN SATURATION: 98 % | WEIGHT: 103 LBS | DIASTOLIC BLOOD PRESSURE: 120 MMHG | RESPIRATION RATE: 18 BRPM | SYSTOLIC BLOOD PRESSURE: 210 MMHG | HEIGHT: 59 IN | TEMPERATURE: 96.8 F | HEART RATE: 70 BPM | BODY MASS INDEX: 20.76 KG/M2

## 2018-07-31 DIAGNOSIS — I10 ESSENTIAL HYPERTENSION: Primary | ICD-10-CM

## 2018-07-31 DIAGNOSIS — E78.00 PURE HYPERCHOLESTEROLEMIA: ICD-10-CM

## 2018-07-31 DIAGNOSIS — Z12.12 SCREENING FOR COLORECTAL CANCER: ICD-10-CM

## 2018-07-31 DIAGNOSIS — Z12.11 SCREENING FOR COLORECTAL CANCER: ICD-10-CM

## 2018-07-31 DIAGNOSIS — Z86.73 HISTORY OF TIA (TRANSIENT ISCHEMIC ATTACK): ICD-10-CM

## 2018-07-31 DIAGNOSIS — I10 ESSENTIAL HYPERTENSION: ICD-10-CM

## 2018-07-31 DIAGNOSIS — I47.1 SVT (SUPRAVENTRICULAR TACHYCARDIA) (HCC): ICD-10-CM

## 2018-07-31 DIAGNOSIS — I10 UNCONTROLLED HYPERTENSION: ICD-10-CM

## 2018-07-31 LAB
ALBUMIN SERPL-MCNC: 4.5 G/DL (ref 3.4–5)
ALBUMIN/GLOB SERPL: 1.4 {RATIO} (ref 0.8–1.7)
ALP SERPL-CCNC: 83 U/L (ref 45–117)
ALT SERPL-CCNC: 17 U/L (ref 13–56)
ANION GAP SERPL CALC-SCNC: 8 MMOL/L (ref 3–18)
AST SERPL-CCNC: 17 U/L (ref 15–37)
BASOPHILS # BLD: 0 K/UL (ref 0–0.1)
BASOPHILS NFR BLD: 0 % (ref 0–2)
BILIRUB SERPL-MCNC: 0.3 MG/DL (ref 0.2–1)
BUN SERPL-MCNC: 17 MG/DL (ref 7–18)
BUN/CREAT SERPL: 19 (ref 12–20)
CALCIUM SERPL-MCNC: 9.7 MG/DL (ref 8.5–10.1)
CHLORIDE SERPL-SCNC: 103 MMOL/L (ref 100–108)
CHOLEST SERPL-MCNC: 237 MG/DL
CO2 SERPL-SCNC: 33 MMOL/L (ref 21–32)
CREAT SERPL-MCNC: 0.89 MG/DL (ref 0.6–1.3)
DIFFERENTIAL METHOD BLD: NORMAL
EOSINOPHIL # BLD: 0 K/UL (ref 0–0.4)
EOSINOPHIL NFR BLD: 0 % (ref 0–5)
ERYTHROCYTE [DISTWIDTH] IN BLOOD BY AUTOMATED COUNT: 12.6 % (ref 11.6–14.5)
GLOBULIN SER CALC-MCNC: 3.2 G/DL (ref 2–4)
GLUCOSE SERPL-MCNC: 79 MG/DL (ref 74–99)
HCT VFR BLD AUTO: 41.9 % (ref 35–45)
HDLC SERPL-MCNC: 88 MG/DL (ref 40–60)
HDLC SERPL: 2.7 {RATIO} (ref 0–5)
HGB BLD-MCNC: 13.8 G/DL (ref 12–16)
LDLC SERPL CALC-MCNC: 138 MG/DL (ref 0–100)
LIPID PROFILE,FLP: ABNORMAL
LYMPHOCYTES # BLD: 1.6 K/UL (ref 0.9–3.6)
LYMPHOCYTES NFR BLD: 29 % (ref 21–52)
MCH RBC QN AUTO: 31.2 PG (ref 24–34)
MCHC RBC AUTO-ENTMCNC: 32.9 G/DL (ref 31–37)
MCV RBC AUTO: 94.8 FL (ref 74–97)
MONOCYTES # BLD: 0.2 K/UL (ref 0.05–1.2)
MONOCYTES NFR BLD: 4 % (ref 3–10)
NEUTS SEG # BLD: 3.6 K/UL (ref 1.8–8)
NEUTS SEG NFR BLD: 67 % (ref 40–73)
PLATELET # BLD AUTO: 300 K/UL (ref 135–420)
PMV BLD AUTO: 10.6 FL (ref 9.2–11.8)
POTASSIUM SERPL-SCNC: 3.9 MMOL/L (ref 3.5–5.5)
PROT SERPL-MCNC: 7.7 G/DL (ref 6.4–8.2)
RBC # BLD AUTO: 4.42 M/UL (ref 4.2–5.3)
SODIUM SERPL-SCNC: 144 MMOL/L (ref 136–145)
TRIGL SERPL-MCNC: 55 MG/DL (ref ?–150)
TSH SERPL DL<=0.05 MIU/L-ACNC: 6.5 UIU/ML (ref 0.36–3.74)
VLDLC SERPL CALC-MCNC: 11 MG/DL
WBC # BLD AUTO: 5.4 K/UL (ref 4.6–13.2)

## 2018-07-31 PROCEDURE — 85025 COMPLETE CBC W/AUTO DIFF WBC: CPT | Performed by: FAMILY MEDICINE

## 2018-07-31 PROCEDURE — 84443 ASSAY THYROID STIM HORMONE: CPT | Performed by: FAMILY MEDICINE

## 2018-07-31 PROCEDURE — 80053 COMPREHEN METABOLIC PANEL: CPT | Performed by: FAMILY MEDICINE

## 2018-07-31 PROCEDURE — 80061 LIPID PANEL: CPT | Performed by: FAMILY MEDICINE

## 2018-07-31 NOTE — PROGRESS NOTES
Patient is here establish care with new pcp. ..1. Have you been to the ER, urgent care clinic since your last visit? Hospitalized since your last visit?no 2. Have you seen or consulted any other health care providers outside of the [facility] since your last visit? Include any pap smears or colon screening.  no

## 2018-07-31 NOTE — PROGRESS NOTES
HISTORY OF PRESENT ILLNESS Arnold Barboza is a 59 y.o. female. HPI Comments: Patient mentions she has had elevated blood pressure since 2001. She mentions she has limited medications that she can take and even the HTZ she takes makes her have numbness and tingling of the hands. She mentions she was on metoprolol and that made her fatigued. She has had reactions to CCB like palpations/ tachycardia. She mentions she is very sensitive to medications and can be on something for one year but then she does not respond to this. I have discussed referral to nephrology for htn control as she does not want to begin anything that will give her side effects. I  Will order some blood work today. Establish Care The history is provided by the patient. This is a new problem. The problem occurs constantly. The problem has not changed since onset. Pertinent negatives include no chest pain, no abdominal pain, no headaches and no shortness of breath. Hypertension The history is provided by the patient. This is a chronic problem. The problem has been gradually worsening. Associated symptoms include malaise/fatigue. Pertinent negatives include no chest pain, no palpitations, no anxiety, no confusion, no headaches, no dizziness, no shortness of breath, no nausea and no vomiting. Risk factors include stress and hypertension. Review of Systems Constitutional: Positive for malaise/fatigue. Negative for chills and fever. HENT: Negative for congestion, sinus pain and sore throat. Respiratory: Negative for cough, hemoptysis, shortness of breath and wheezing. Cardiovascular: Negative for chest pain, palpitations and leg swelling. Gastrointestinal: Negative for abdominal pain, constipation, nausea and vomiting. Musculoskeletal: Negative for back pain, joint pain and myalgias. Neurological: Negative for dizziness, tingling, focal weakness, weakness and headaches.   
Psychiatric/Behavioral: Negative for confusion, depression and suicidal ideas. The patient is not nervous/anxious and does not have insomnia. Visit Vitals  BP (!) 210/120  Pulse 70  Temp 96.8 °F (36 °C) (Oral)  Resp 18  Ht 4' 11\" (1.499 m)  Wt 103 lb (46.7 kg)  SpO2 98%  BMI 20.8 kg/m2 Physical Exam  
Constitutional: She is oriented to person, place, and time. She appears well-developed and well-nourished. No distress. HENT:  
Head: Normocephalic and atraumatic. Right Ear: External ear normal.  
Left Ear: External ear normal.  
Mouth/Throat: No oropharyngeal exudate. Eyes: EOM are normal. Pupils are equal, round, and reactive to light. No scleral icterus. Neck: Normal range of motion. No thyromegaly present. Cardiovascular: Normal rate, regular rhythm and normal heart sounds. Pulmonary/Chest: Effort normal and breath sounds normal. No respiratory distress. She has no wheezes. Abdominal: Soft. Bowel sounds are normal. She exhibits no distension. There is no tenderness. Lymphadenopathy:  
  She has no cervical adenopathy. Neurological: She is alert and oriented to person, place, and time. Psychiatric: She has a normal mood and affect. ASSESSMENT and PLAN Uncontrolled hypertension : 
1) Goal blood pressure less than equal to 140/90 mmHg, goal BP can vary depending on risk factors as discussed. 2) Lifestyle modifications discussed with patient, low sodium <2 gm, low salt , DASH diet 3) Exercise for at least 30 min 3-5 times a week for goal BMI of less than or equal  To 25. 
4) Continue current medications as prescribed. 5) Please begin medication as discussed for better blood pressure control, explained side effects and patient verbalized understanding. 6) Goal LDL<100. 
7) Please monitor your blood pressure and keep a log to bring in with you at each visit. 8) Discussed risk factors with patient such as CAD, FAST of stroke symptoms, pt verbalizes understanding.  
9) Please avoid smoking , alcohol and any illicit drug use if applicable to you. 10) Discussed lifestyle modifications ,dietary control and BP monitoring at home Referral to nephrologist for hypertension control

## 2018-07-31 NOTE — ACP (ADVANCE CARE PLANNING)
Advance Care Planning (ACP) Provider Note - Comprehensive     Date of ACP Conversation: 07/31/18  Persons included in Conversation:  patient  Length of ACP Conversation in minutes:  Five minutes              General ACP for ALL Patients with Decision Making Capacity:   Importance of advance care planning, including choosing a healthcare agent to communicate patient's healthcare decisions if patient lost the ability to make decisions, such as after a sudden illness or accident  Understanding of the healthcare agent role was assessed and information provided        For Serious or Chronic Illness:  Understanding of medical condition      Interventions Provided:  Recommended completion of Advance Directive form after review of ACP materials and conversation with prospective healthcare agent   Recommended communicating the plan and making copies for the healthcare agent, personal physician, and others as appropriate (e.g., health system)  Recommended review of completed ACP document annually or upon change in health status

## 2018-07-31 NOTE — LETTER
NOTIFICATION RETURN TO WORK / SCHOOL 
 
7/31/2018 11:37 AM 
 
Ms. Almita Lozano 
[address] [facility] [address] To Whom It May Concern: 
 
Josue Srivastava is currently under the care of [address] [place]. Ms.Doris Sho Lozano has a medical condition that involves frequent bathroom visits secondary to medication use. Please allow her bathroom access if possible. Thank You. If there are questions or concerns please have the patient contact our office. Sincerely, Ramona Rodas MD

## 2018-07-31 NOTE — LETTER
NOTIFICATION RETURN TO WORK / SCHOOL 
 
7/31/2018 11:50 AM 
 
Ms. Trevor Light [facility] 
[address] [address] To Whom It May Concern: 
 
Catina Fisher is currently under the care of [address] [facility]. She will return to work/school on: 8/1/18 If there are questions or concerns please have the patient contact our office. Sincerely, Nevaeh Liu MD

## 2018-08-01 ENCOUNTER — TELEPHONE (OUTPATIENT)
Dept: FAMILY MEDICINE CLINIC | Age: 58
End: 2018-08-01

## 2018-08-01 DIAGNOSIS — R79.89 ELEVATED TSH: Primary | ICD-10-CM

## 2018-08-26 ENCOUNTER — HOSPITAL ENCOUNTER (OUTPATIENT)
Dept: LAB | Age: 58
Discharge: HOME | End: 2018-08-26
Payer: COMMERCIAL

## 2018-08-26 DIAGNOSIS — Z12.12 SCREENING FOR COLORECTAL CANCER: ICD-10-CM

## 2018-08-26 DIAGNOSIS — Z12.11 SCREENING FOR COLORECTAL CANCER: ICD-10-CM

## 2018-08-26 PROCEDURE — 82274 ASSAY TEST FOR BLOOD FECAL: CPT | Performed by: FAMILY MEDICINE

## 2018-08-27 ENCOUNTER — OFFICE VISIT (OUTPATIENT)
Dept: FAMILY MEDICINE CLINIC | Age: 58
End: 2018-08-27

## 2018-08-27 VITALS
RESPIRATION RATE: 18 BRPM | HEIGHT: 59 IN | TEMPERATURE: 97.7 F | DIASTOLIC BLOOD PRESSURE: 110 MMHG | BODY MASS INDEX: 20.56 KG/M2 | OXYGEN SATURATION: 100 % | SYSTOLIC BLOOD PRESSURE: 190 MMHG | WEIGHT: 102 LBS | HEART RATE: 63 BPM

## 2018-08-27 DIAGNOSIS — Z12.31 VISIT FOR SCREENING MAMMOGRAM: ICD-10-CM

## 2018-08-27 DIAGNOSIS — I10 ESSENTIAL HYPERTENSION: Primary | ICD-10-CM

## 2018-08-27 DIAGNOSIS — N39.0 URINARY TRACT INFECTION WITHOUT HEMATURIA, SITE UNSPECIFIED: ICD-10-CM

## 2018-08-27 DIAGNOSIS — R79.89 ELEVATED TSH: ICD-10-CM

## 2018-08-27 DIAGNOSIS — R39.15 URINARY URGENCY: ICD-10-CM

## 2018-08-27 RX ORDER — CIPROFLOXACIN 500 MG/1
500 TABLET ORAL 2 TIMES DAILY
Qty: 20 TAB | Refills: 0 | Status: SHIPPED | OUTPATIENT
Start: 2018-08-27 | End: 2018-08-27 | Stop reason: SDUPTHER

## 2018-08-27 RX ORDER — CIPROFLOXACIN 500 MG/1
500 TABLET ORAL 2 TIMES DAILY
Qty: 20 TAB | Refills: 0 | Status: SHIPPED | OUTPATIENT
Start: 2018-08-27 | End: 2018-09-06

## 2018-08-27 RX ORDER — SPIRONOLACTONE 25 MG/1
1 TABLET ORAL DAILY
Refills: 6 | COMMUNITY
Start: 2018-08-22

## 2018-08-27 NOTE — PROGRESS NOTES
Patient is here for htn f/u. BP is elevated provider aware. 1. Have you been to the ER, urgent care clinic since your last visit? Hospitalized since your last visit?no    2. Have you seen or consulted any other health care providers outside of the [address] [place] [address] since your last visit? Include any pap smears or colon screening.   no

## 2018-08-27 NOTE — PROGRESS NOTES
HISTORY OF PRESENT ILLNESS  Catina Fisher is a 59 y.o. female. HPI Comments: Patient is here to follow up and she did recently see her nephrologist. She was started on spironolactone 25 mg once a day. Her blood pressure at the doctors office is high due to anxiety but at home she has brought her log in and her blood pressures are around 130/80. She does have some testing left to be done with her specialist.  She will also come back tomorrow for her thyroid retesting. She does mention that last week at her nephrologist office she did a urine dipstick which was negative for UTI but she continues to have urgency which does bother her and feels a UTI may be coming on. Hypertension    The history is provided by the patient. This is a chronic problem. The problem has been gradually improving. Associated symptoms include anxiety. Pertinent negatives include no chest pain, no orthopnea, no palpitations, no confusion, no malaise/fatigue, no blurred vision, no headaches, no tinnitus, no neck pain, no peripheral edema, no dizziness, no shortness of breath, no nausea and no vomiting. Risk factors include hypertension and stress. Review of Systems   Constitutional: Negative for fever, malaise/fatigue and weight loss. HENT: Negative for hearing loss, nosebleeds, sinus pain, sore throat and tinnitus. Eyes: Negative for blurred vision, double vision, pain and discharge. Respiratory: Negative for cough, sputum production and shortness of breath. Cardiovascular: Negative for chest pain, palpitations and orthopnea. Gastrointestinal: Negative for blood in stool, constipation, nausea and vomiting. Genitourinary: Positive for urgency. Negative for dysuria, frequency and hematuria. Musculoskeletal: Negative for joint pain and neck pain. Neurological: Negative for dizziness, tingling, tremors, focal weakness and headaches. Endo/Heme/Allergies: Negative for environmental allergies. Psychiatric/Behavioral: Negative for confusion and depression. The patient is nervous/anxious. Visit Vitals    BP (!) 190/110    Pulse 63    Temp 97.7 °F (36.5 °C) (Oral)    Resp 18    Ht 4' 11\" (1.499 m)    Wt 102 lb (46.3 kg)    SpO2 100%    BMI 20.6 kg/m2       Physical Exam   Constitutional: She is oriented to person, place, and time. She appears well-developed and well-nourished. No distress. HENT:   Head: Normocephalic and atraumatic. Right Ear: External ear normal.   Left Ear: External ear normal.   Mouth/Throat: Oropharynx is clear and moist. No oropharyngeal exudate. Eyes: EOM are normal. Pupils are equal, round, and reactive to light. No scleral icterus. Neck: No thyromegaly present. Cardiovascular: Normal rate, regular rhythm and normal heart sounds. Pulmonary/Chest: Effort normal and breath sounds normal. No respiratory distress. She has no wheezes. Abdominal: Soft. Bowel sounds are normal. She exhibits no distension. There is no tenderness. Lymphadenopathy:     She has no cervical adenopathy. Neurological: She is alert and oriented to person, place, and time. Psychiatric: She has a normal mood and affect. ASSESSMENT and PLAN  Hypertension :  1) Goal blood pressure less than equal to 140/90 mmHg, goal BP can vary depending on risk factors as discussed. 2) Lifestyle modifications discussed with patient, low sodium <2 gm, low salt , DASH diet  3) Exercise for at least 30 min 3-5 times a week for goal BMI of less than or equal  To 25.  4) Continue current medications as prescribed. 5) Please begin medication as discussed for better blood pressure control, explained side effects and patient verbalized understanding. 6) Goal LDL<100.  7) Please monitor your blood pressure and keep a log to bring in with you at each visit. 8) Discussed risk factors with patient such as CAD, FAST of stroke symptoms, pt verbalizes understanding.   9) Please avoid smoking , alcohol and any illicit drug use if applicable to you. 10) Discussed lifestyle modifications ,dietary control and BP monitoring at home       Elevated tsh :  - Repeat blood work has been ordered  :  Urinary urgency /UTI  - Patient has had ciprofloxacin before and has tolerated the antibiotic.

## 2018-08-28 ENCOUNTER — HOSPITAL ENCOUNTER (OUTPATIENT)
Dept: LAB | Age: 58
Discharge: HOME | End: 2018-08-28
Payer: COMMERCIAL

## 2018-08-28 DIAGNOSIS — R79.89 ELEVATED TSH: ICD-10-CM

## 2018-08-28 DIAGNOSIS — R39.15 URINARY URGENCY: ICD-10-CM

## 2018-08-28 LAB
T4 FREE SERPL-MCNC: 1.1 NG/DL (ref 0.7–1.5)
TSH SERPL DL<=0.05 MIU/L-ACNC: 4.5 UIU/ML (ref 0.36–3.74)

## 2018-08-28 PROCEDURE — 87086 URINE CULTURE/COLONY COUNT: CPT | Performed by: FAMILY MEDICINE

## 2018-08-28 PROCEDURE — 84443 ASSAY THYROID STIM HORMONE: CPT | Performed by: FAMILY MEDICINE

## 2018-08-28 PROCEDURE — 84480 ASSAY TRIIODOTHYRONINE (T3): CPT | Performed by: FAMILY MEDICINE

## 2018-08-28 PROCEDURE — 84439 ASSAY OF FREE THYROXINE: CPT | Performed by: FAMILY MEDICINE

## 2018-08-28 PROCEDURE — 86376 MICROSOMAL ANTIBODY EACH: CPT | Performed by: FAMILY MEDICINE

## 2018-08-29 LAB
T3 SERPL-MCNC: 106 NG/DL (ref 71–180)
THYROGLOB AB SERPL-ACNC: 131 IU/ML (ref 0–0.9)
THYROPEROXIDASE AB SERPL-ACNC: 332 IU/ML (ref 0–34)

## 2018-08-31 ENCOUNTER — TELEPHONE (OUTPATIENT)
Dept: FAMILY MEDICINE CLINIC | Age: 58
End: 2018-08-31

## 2018-08-31 LAB
BACTERIA SPEC CULT: NORMAL
SERVICE CMNT-IMP: NORMAL

## 2018-09-03 LAB — HEMOCCULT STL QL IA: NEGATIVE

## 2018-10-15 ENCOUNTER — TELEPHONE (OUTPATIENT)
Dept: FAMILY MEDICINE CLINIC | Age: 58
End: 2018-10-15

## 2018-10-15 NOTE — TELEPHONE ENCOUNTER
Connie patient will either need to be seen or go to emergency room if these symptoms are persistent in regards to her email.

## 2022-09-16 NOTE — TELEPHONE ENCOUNTER
Called pt and left message. Call back number left and I myself or one of the other nurses will attempt to contact again.     The call was to inform pt results No